# Patient Record
Sex: FEMALE | Race: WHITE | URBAN - METROPOLITAN AREA
[De-identification: names, ages, dates, MRNs, and addresses within clinical notes are randomized per-mention and may not be internally consistent; named-entity substitution may affect disease eponyms.]

---

## 2017-12-26 ENCOUNTER — HOSPITAL ENCOUNTER (OUTPATIENT)
Dept: MEDSURG UNIT | Facility: HOSPITAL | Age: 77
End: 2017-12-27
Attending: HOSPITALIST | Admitting: HOSPITALIST

## 2017-12-26 LAB
ABS NEUT (OLG): 27.84 X10(3)/MCL
ALBUMIN SERPL-MCNC: 2.3 GM/DL (ref 3.4–5)
ALBUMIN/GLOB SERPL: 1 RATIO (ref 1–2)
ALP SERPL-CCNC: 291 UNIT/L (ref 45–117)
ALT SERPL-CCNC: 13 UNIT/L (ref 12–78)
ANISOCYTOSIS BLD QL SMEAR: ABNORMAL
APPEARANCE, UA: ABNORMAL
APTT PPP: 36.2 SECOND(S) (ref 23.3–37)
AST SERPL-CCNC: 55 UNIT/L (ref 15–37)
BACTERIA #/AREA URNS AUTO: ABNORMAL /[HPF]
BASOPHILS NFR BLD MANUAL: 0 %
BILIRUB SERPL-MCNC: 0.4 MG/DL (ref 0.2–1)
BILIRUB UR QL STRIP: NEGATIVE
BILIRUBIN DIRECT+TOT PNL SERPL-MCNC: 0.2 MG/DL
BILIRUBIN DIRECT+TOT PNL SERPL-MCNC: 0.2 MG/DL
BUN SERPL-MCNC: 26 MG/DL (ref 7–18)
CALCIUM SERPL-MCNC: 8.6 MG/DL (ref 8.5–10.1)
CHLORIDE SERPL-SCNC: 99 MMOL/L (ref 98–107)
CK MB SERPL-MCNC: <1 NG/ML (ref 1–3.6)
CK SERPL-CCNC: 25 UNIT/L (ref 26–192)
CO2 SERPL-SCNC: 28 MMOL/L (ref 21–32)
COLOR UR: ABNORMAL
CREAT SERPL-MCNC: 1 MG/DL (ref 0.6–1.3)
CROSSMATCH INTERPRETATION: NORMAL
EOSINOPHIL NFR BLD MANUAL: 4 %
ERYTHROCYTE [DISTWIDTH] IN BLOOD BY AUTOMATED COUNT: 19.2 % (ref 11.5–14.5)
FERRITIN SERPL-MCNC: 67.3 NG/ML (ref 8–388)
GLOBULIN SER-MCNC: 3.6 GM/ML (ref 2.3–3.5)
GLUCOSE (UA): NORMAL
GLUCOSE SERPL-MCNC: 150 MG/DL (ref 74–106)
GRANULOCYTES NFR BLD MANUAL: 85 % (ref 43–75)
HCT VFR BLD AUTO: 16.4 % (ref 35–46)
HGB BLD-MCNC: 5.1 GM/DL (ref 12–16)
HGB UR QL STRIP: >1 MG/DL
HYALINE CASTS #/AREA URNS LPF: 0 /[LPF]
HYPOCHROMIA BLD QL SMEAR: ABNORMAL
INR PPP: 1.12 (ref 0.9–1.2)
IRON SATN MFR SERPL: 10 % (ref 15–50)
IRON SERPL-MCNC: 25 MCG/DL (ref 50–170)
KETONES UR QL STRIP: NEGATIVE
LACTATE SERPL-SCNC: 1.6 MMOL/L (ref 0.4–2)
LEUKOCYTE ESTERASE UR QL STRIP: 500 LEU/UL
LYMPHOCYTES NFR BLD MANUAL: 7 % (ref 20.5–51.1)
MACROCYTES BLD QL SMEAR: ABNORMAL
MCH RBC QN AUTO: 31.5 PG (ref 26–34)
MCHC RBC AUTO-ENTMCNC: 31.1 GM/DL (ref 31–37)
MCV RBC AUTO: 101.2 FL (ref 80–100)
MICROCYTES BLD QL SMEAR: ABNORMAL
MONOCYTES NFR BLD MANUAL: 2 % (ref 2–9)
MUCOUS THREADS URNS QL MICRO: ABNORMAL
NEUTS BAND NFR BLD MANUAL: 2 % (ref 0–10)
NITRITE UR QL STRIP: ABNORMAL
NRBC BLD MANUAL-RTO: 1 %
PH UR STRIP: 8 [PH] (ref 4.5–8)
PLATELET # BLD AUTO: 341 X10(3)/MCL (ref 130–400)
PLATELET # BLD EST: ABNORMAL 10*3/UL
PMV BLD AUTO: 10.5 FL (ref 7.4–10.4)
POC TROPONIN: 0 NG/ML (ref 0–0.08)
POIKILOCYTOSIS BLD QL SMEAR: ABNORMAL
POLYCHROMASIA BLD QL SMEAR: ABNORMAL
POTASSIUM SERPL-SCNC: 4.9 MMOL/L (ref 3.5–5.1)
PRODUCT READY: NORMAL
PROT SERPL-MCNC: 5.9 GM/DL (ref 6.4–8.2)
PROT UR QL STRIP: 100 MG/DL
PROTHROMBIN TIME: 14.2 SECOND(S) (ref 11.9–14.4)
RBC # BLD AUTO: 1.62 X10(6)/MCL (ref 4–5.2)
RBC #/AREA URNS AUTO: >=100 /[HPF]
RBC MORPH BLD: ABNORMAL
SCHISTOCYTES BLD QL AUTO: ABNORMAL
SODIUM SERPL-SCNC: 135 MMOL/L (ref 136–145)
SP GR UR STRIP: 1 (ref 1–1.03)
SPHEROCYTES BLD QL SMEAR: ABNORMAL
SQUAMOUS #/AREA URNS LPF: ABNORMAL /[LPF]
TIBC SERPL-MCNC: 249 MCG/DL (ref 250–450)
TRANSFERRIN SERPL-MCNC: 195 MG/DL (ref 200–360)
TRANSFUSION ORDER: NORMAL
UROBILINOGEN UR STRIP-ACNC: NORMAL
WBC # SPEC AUTO: 35.1 X10(3)/MCL (ref 4.5–11)
WBC #/AREA URNS AUTO: >=100 /HPF

## 2017-12-27 LAB
ABS NEUT (OLG): 20.64 X10(3)/MCL (ref 2.1–9.2)
ALBUMIN SERPL-MCNC: 2 GM/DL (ref 3.4–5)
ALBUMIN/GLOB SERPL: 1 RATIO (ref 1–2)
ALP SERPL-CCNC: 248 UNIT/L (ref 45–117)
ALT SERPL-CCNC: 11 UNIT/L (ref 12–78)
APTT PPP: 32.5 SECOND(S) (ref 23.3–37)
AST SERPL-CCNC: 43 UNIT/L (ref 15–37)
BASOPHILS # BLD AUTO: 0.1 X10(3)/MCL
BASOPHILS NFR BLD AUTO: 0 % (ref 0–1)
BILIRUB SERPL-MCNC: 1.1 MG/DL (ref 0.2–1)
BILIRUBIN DIRECT+TOT PNL SERPL-MCNC: 0.4 MG/DL
BILIRUBIN DIRECT+TOT PNL SERPL-MCNC: 0.7 MG/DL
BUN SERPL-MCNC: 21 MG/DL (ref 7–18)
CALCIUM SERPL-MCNC: 7.7 MG/DL (ref 8.5–10.1)
CHLORIDE SERPL-SCNC: 100 MMOL/L (ref 98–107)
CO2 SERPL-SCNC: 27 MMOL/L (ref 21–32)
COLOR STL: NORMAL
CONSISTENCY STL: NORMAL
CREAT SERPL-MCNC: 0.8 MG/DL (ref 0.6–1.3)
EOSINOPHIL # BLD AUTO: 1.32 10*3/UL
EOSINOPHIL NFR BLD AUTO: 5 % (ref 0–5)
ERYTHROCYTE [DISTWIDTH] IN BLOOD BY AUTOMATED COUNT: 17.8 % (ref 11.5–14.5)
GLOBULIN SER-MCNC: 3.4 GM/ML (ref 2.3–3.5)
GLUCOSE SERPL-MCNC: 116 MG/DL (ref 74–106)
HCT VFR BLD AUTO: 24.9 % (ref 35–46)
HEMOCCULT SP1 STL QL: NEGATIVE
HGB BLD-MCNC: 8.4 GM/DL (ref 12–16)
IMM GRANULOCYTES # BLD AUTO: 0.37 10*3/UL
IMM GRANULOCYTES NFR BLD AUTO: 1 %
INR PPP: 1.21 (ref 0.9–1.2)
LYMPHOCYTES # BLD AUTO: 1.84 X10(3)/MCL
LYMPHOCYTES NFR BLD AUTO: 7 % (ref 15–40)
MCH RBC QN AUTO: 31.3 PG (ref 26–34)
MCHC RBC AUTO-ENTMCNC: 33.7 GM/DL (ref 31–37)
MCV RBC AUTO: 92.9 FL (ref 80–100)
MONOCYTES # BLD AUTO: 1.7 X10(3)/MCL
MONOCYTES NFR BLD AUTO: 6 % (ref 4–12)
NEUTROPHILS # BLD AUTO: 20.64 X10(3)/MCL
NEUTROPHILS NFR BLD AUTO: 80 X10(3)/MCL
PLATELET # BLD AUTO: 260 X10(3)/MCL (ref 130–400)
PMV BLD AUTO: 10.6 FL (ref 7.4–10.4)
POTASSIUM SERPL-SCNC: 4.5 MMOL/L (ref 3.5–5.1)
PROT SERPL-MCNC: 5.4 GM/DL (ref 6.4–8.2)
PROTHROMBIN TIME: 15.1 SECOND(S) (ref 11.9–14.4)
RBC # BLD AUTO: 2.68 X10(6)/MCL (ref 4–5.2)
SODIUM SERPL-SCNC: 137 MMOL/L (ref 136–145)
WBC # SPEC AUTO: 26 X10(3)/MCL (ref 4.5–11)

## 2017-12-31 LAB
FINAL CULTURE: NORMAL
FINAL CULTURE: NORMAL

## 2021-06-17 ENCOUNTER — HOSPITAL ENCOUNTER (OUTPATIENT)
Dept: MEDSURG UNIT | Facility: HOSPITAL | Age: 81
End: 2021-06-18
Attending: INTERNAL MEDICINE | Admitting: INTERNAL MEDICINE

## 2021-06-17 LAB
ABS NEUT (OLG): 6.83 X10(3)/MCL (ref 2.1–9.2)
ALBUMIN SERPL-MCNC: 3 GM/DL (ref 3.4–4.8)
ALBUMIN/GLOB SERPL: 0.9 RATIO (ref 1.1–2)
ALP SERPL-CCNC: 302 UNIT/L (ref 40–150)
ALT SERPL-CCNC: 20 UNIT/L (ref 0–55)
ANISOCYTOSIS BLD QL SMEAR: NORMAL
APPEARANCE, UA: ABNORMAL
APTT PPP: 30.8 SECOND(S) (ref 23.3–37)
APTT PPP: 34.9 SECOND(S) (ref 23.3–37)
AST SERPL-CCNC: 43 UNIT/L (ref 5–34)
BACTERIA #/AREA URNS AUTO: ABNORMAL /HPF
BASOPHILS # BLD AUTO: 0.1 X10(3)/MCL (ref 0–0.2)
BASOPHILS NFR BLD AUTO: 1 %
BILIRUB SERPL-MCNC: 0.8 MG/DL
BILIRUB UR QL STRIP: NEGATIVE
BILIRUBIN DIRECT+TOT PNL SERPL-MCNC: 0.3 MG/DL (ref 0–0.8)
BILIRUBIN DIRECT+TOT PNL SERPL-MCNC: 0.5 MG/DL (ref 0–0.5)
BNP BLD-MCNC: 174.7 PG/ML
BUN SERPL-MCNC: 14.3 MG/DL (ref 9.8–20.1)
CALCIUM SERPL-MCNC: 9.1 MG/DL (ref 8.4–10.2)
CHLORIDE SERPL-SCNC: 90 MMOL/L (ref 98–107)
CHLORIDE UR-SCNC: 43 MMOL/L
CO2 SERPL-SCNC: 29 MMOL/L (ref 23–31)
COLOR UR: ABNORMAL
CREAT SERPL-MCNC: 0.82 MG/DL (ref 0.55–1.02)
CROSSMATCH INTERPRETATION: NORMAL
EOSINOPHIL # BLD AUTO: 0.8 X10(3)/MCL (ref 0–0.9)
EOSINOPHIL NFR BLD AUTO: 8 %
ERYTHROCYTE [DISTWIDTH] IN BLOOD BY AUTOMATED COUNT: 19.2 % (ref 11.5–14.5)
EST. AVERAGE GLUCOSE BLD GHB EST-MCNC: 99.7 MG/DL
GLOBULIN SER-MCNC: 3.2 GM/DL (ref 2.4–3.5)
GLUCOSE (UA): NEGATIVE
GLUCOSE SERPL-MCNC: 121 MG/DL (ref 82–115)
HBA1C MFR BLD: 5.1 %
HCT VFR BLD AUTO: 23.7 % (ref 35–46)
HCT VFR BLD AUTO: 29.1 % (ref 35–46)
HGB BLD-MCNC: 6.3 GM/DL (ref 12–16)
HGB BLD-MCNC: 8.3 GM/DL (ref 12–16)
HGB UR QL STRIP: >1 MG/DL
HYALINE CASTS #/AREA URNS LPF: ABNORMAL /LPF
HYPOCHROMIA BLD QL SMEAR: NORMAL
IMM GRANULOCYTES # BLD AUTO: 0.05 10*3/UL
IMM GRANULOCYTES NFR BLD AUTO: 0 %
INR PPP: 1.18 (ref 0.9–1.2)
INR PPP: 1.2 (ref 0.9–1.2)
KETONES UR QL STRIP: ABNORMAL
LEUKOCYTE ESTERASE UR QL STRIP: 500 LEU/UL
LYMPHOCYTES # BLD AUTO: 1.1 X10(3)/MCL (ref 0.6–4.6)
LYMPHOCYTES NFR BLD AUTO: 12 %
MAGNESIUM SERPL-MCNC: 1.6 MG/DL (ref 1.6–2.6)
MCH RBC QN AUTO: 17.5 PG (ref 26–34)
MCHC RBC AUTO-ENTMCNC: 26.6 GM/DL (ref 31–37)
MCV RBC AUTO: 66 FL (ref 80–100)
MICROCYTES BLD QL SMEAR: NORMAL
MONOCYTES # BLD AUTO: 0.7 X10(3)/MCL (ref 0.1–1.3)
MONOCYTES NFR BLD AUTO: 7 %
NEUTROPHILS # BLD AUTO: 6.83 X10(3)/MCL (ref 2.1–9.2)
NEUTROPHILS NFR BLD AUTO: 72 %
NITRITE UR QL STRIP: NEGATIVE
NRBC BLD AUTO-RTO: 0.2 % (ref 0–0.2)
OSMOLALITY SERPL: 265 MOSM/KG (ref 280–300)
OSMOLALITY UR: 639 MOSM/KG (ref 300–1300)
OVALOCYTES BLD QL SMEAR: NORMAL
PH UR STRIP: 6 [PH] (ref 4.5–8)
PHOSPHATE SERPL-MCNC: 3.3 MG/DL (ref 2.3–4.7)
PLATELET # BLD AUTO: 252 X10(3)/MCL (ref 130–400)
PLATELET # BLD EST: ADEQUATE 10*3/UL
PMV BLD AUTO: 8.9 FL (ref 7.4–10.4)
POIKILOCYTOSIS BLD QL SMEAR: NORMAL
POLYCHROMASIA BLD QL SMEAR: NORMAL
POTASSIUM SERPL-SCNC: 5.6 MMOL/L (ref 3.5–5.1)
POTASSIUM UR-SCNC: 53 MMOL/L
PRODUCT READY: NORMAL
PROT SERPL-MCNC: 6.2 GM/DL (ref 5.8–7.6)
PROT UR QL STRIP: 70 MG/DL
PROTHROMBIN TIME: 14.9 SECOND(S) (ref 11.9–14.4)
PROTHROMBIN TIME: 15.1 SECOND(S) (ref 11.9–14.4)
RBC # BLD AUTO: 3.59 X10(6)/MCL (ref 4–5.2)
RBC #/AREA URNS AUTO: >=100 /HPF
RBC MORPH BLD: NORMAL
SARS-COV-2 AG RESP QL IA.RAPID: NEGATIVE
SCHISTOCYTES BLD QL AUTO: NORMAL
SODIUM SERPL-SCNC: 127 MMOL/L (ref 136–145)
SODIUM UR-SCNC: 20 MMOL/L
SP GR UR STRIP: >=1.05 (ref 1–1.03)
SQUAMOUS #/AREA URNS LPF: ABNORMAL /LPF
T4 FREE SERPL-MCNC: 1.34 NG/DL (ref 0.7–1.48)
TARGETS BLD QL SMEAR: NORMAL
TROPONIN I SERPL-MCNC: <0.01 NG/ML (ref 0–0.04)
TSH SERPL-ACNC: 1.11 UIU/ML (ref 0.35–4.94)
UROBILINOGEN UR STRIP-ACNC: NORMAL
WBC # SPEC AUTO: 9.6 X10(3)/MCL (ref 4.5–11)
WBC #/AREA URNS AUTO: ABNORMAL /HPF

## 2021-06-18 LAB
ABS NEUT (OLG): 8.77 X10(3)/MCL (ref 2.1–9.2)
ALBUMIN SERPL-MCNC: 2.9 GM/DL (ref 3.4–4.8)
ALBUMIN/GLOB SERPL: 1 RATIO (ref 1.1–2)
ALP SERPL-CCNC: 281 UNIT/L (ref 40–150)
ALT SERPL-CCNC: 20 UNIT/L (ref 0–55)
AST SERPL-CCNC: 41 UNIT/L (ref 5–34)
BASOPHILS # BLD AUTO: 0.1 X10(3)/MCL (ref 0–0.2)
BASOPHILS NFR BLD AUTO: 1 %
BILIRUB SERPL-MCNC: 1 MG/DL
BILIRUBIN DIRECT+TOT PNL SERPL-MCNC: 0.5 MG/DL (ref 0–0.5)
BILIRUBIN DIRECT+TOT PNL SERPL-MCNC: 0.5 MG/DL (ref 0–0.8)
BUN SERPL-MCNC: 10.2 MG/DL (ref 9.8–20.1)
BUN SERPL-MCNC: 8.2 MG/DL (ref 9.8–20.1)
CALCIUM SERPL-MCNC: 8 MG/DL (ref 8.4–10.2)
CALCIUM SERPL-MCNC: 8.5 MG/DL (ref 8.4–10.2)
CHLORIDE SERPL-SCNC: 90 MMOL/L (ref 98–107)
CHLORIDE SERPL-SCNC: 91 MMOL/L (ref 98–107)
CO2 SERPL-SCNC: 26 MMOL/L (ref 23–31)
CO2 SERPL-SCNC: 28 MMOL/L (ref 23–31)
CREAT SERPL-MCNC: 0.7 MG/DL (ref 0.55–1.02)
CREAT SERPL-MCNC: 0.74 MG/DL (ref 0.55–1.02)
CREAT SERPL-MCNC: 0.77 MG/DL (ref 0.55–1.02)
CREAT UR-MCNC: 16.4 MG/DL (ref 45–106)
CREAT/UREA NIT SERPL: 11
EOSINOPHIL # BLD AUTO: 1 X10(3)/MCL (ref 0–0.9)
EOSINOPHIL NFR BLD AUTO: 8 %
ERYTHROCYTE [DISTWIDTH] IN BLOOD BY AUTOMATED COUNT: 22.4 % (ref 11.5–14.5)
FERRITIN SERPL-MCNC: 8.24 NG/ML (ref 4.63–204)
FESODIUM % (OHS): 3 %
GLOBULIN SER-MCNC: 2.9 GM/DL (ref 2.4–3.5)
GLUCOSE SERPL-MCNC: 122 MG/DL (ref 82–115)
GLUCOSE SERPL-MCNC: 141 MG/DL (ref 82–115)
HCT VFR BLD AUTO: 29.4 % (ref 35–46)
HGB BLD-MCNC: 8.5 GM/DL (ref 12–16)
IMM GRANULOCYTES # BLD AUTO: 0.04 10*3/UL
IMM GRANULOCYTES NFR BLD AUTO: 0 %
IRON SATN MFR SERPL: 5 % (ref 20–50)
IRON SERPL-MCNC: 13 UG/DL (ref 50–170)
LYMPHOCYTES # BLD AUTO: 1.1 X10(3)/MCL (ref 0.6–4.6)
LYMPHOCYTES NFR BLD AUTO: 10 %
MAGNESIUM SERPL-MCNC: 1.8 MG/DL (ref 1.6–2.6)
MCH RBC QN AUTO: 20.7 PG (ref 26–34)
MCHC RBC AUTO-ENTMCNC: 28.9 GM/DL (ref 31–37)
MCV RBC AUTO: 71.5 FL (ref 80–100)
MONOCYTES # BLD AUTO: 0.7 X10(3)/MCL (ref 0.1–1.3)
MONOCYTES NFR BLD AUTO: 6 %
NEUTROPHILS # BLD AUTO: 8.77 X10(3)/MCL (ref 2.1–9.2)
NEUTROPHILS NFR BLD AUTO: 75 %
NRBC BLD AUTO-RTO: 0.2 % (ref 0–0.2)
PLATELET # BLD AUTO: 255 X10(3)/MCL (ref 130–400)
PMV BLD AUTO: 9.3 FL (ref 7.4–10.4)
POTASSIUM SERPL-SCNC: 3.3 MMOL/L (ref 3.5–5.1)
POTASSIUM SERPL-SCNC: 3.8 MMOL/L (ref 3.5–5.1)
PROT SERPL-MCNC: 5.8 GM/DL (ref 5.8–7.6)
RBC # BLD AUTO: 4.11 X10(6)/MCL (ref 4–5.2)
SODIUM SERPL-SCNC: 126 MMOL/L (ref 136–145)
SODIUM SERPL-SCNC: 129 MMOL/L (ref 136–145)
SODIUM UR-SCNC: 84 MMOL/L
TIBC SERPL-MCNC: 261 UG/DL (ref 70–310)
TIBC SERPL-MCNC: 274 UG/DL (ref 250–450)
TRANSFERRIN SERPL-MCNC: 250 MG/DL
WBC # SPEC AUTO: 11.7 X10(3)/MCL (ref 4.5–11)

## 2021-06-20 LAB — FINAL CULTURE: NORMAL

## 2021-07-29 ENCOUNTER — HISTORICAL (OUTPATIENT)
Dept: ADMINISTRATIVE | Facility: HOSPITAL | Age: 81
End: 2021-07-29

## 2021-07-29 LAB
ABS NEUT (OLG): 4.86 X10(3)/MCL (ref 2.1–9.2)
ALBUMIN SERPL-MCNC: 2.4 GM/DL (ref 3.4–4.8)
ALBUMIN/GLOB SERPL: 0.8 RATIO (ref 1.1–2)
ALP SERPL-CCNC: 135 UNIT/L (ref 40–150)
ALT SERPL-CCNC: 8 UNIT/L (ref 0–55)
ANISOCYTOSIS BLD QL SMEAR: ABNORMAL
AST SERPL-CCNC: 19 UNIT/L (ref 5–34)
BASOPHILS # BLD AUTO: 0.03 X10(3)/MCL (ref 0–0.2)
BASOPHILS NFR BLD AUTO: 0.5 % (ref 0–1)
BILIRUB SERPL-MCNC: 0.5 MG/DL (ref 0.2–1.2)
BILIRUBIN DIRECT+TOT PNL SERPL-MCNC: 0.2 MG/DL (ref 0–0.5)
BILIRUBIN DIRECT+TOT PNL SERPL-MCNC: 0.3 MG/DL (ref 0–0.8)
BUN SERPL-MCNC: 9.3 MG/DL (ref 9.8–20.1)
CALCIUM SERPL-MCNC: 8.4 MG/DL (ref 8.4–10.2)
CHLORIDE SERPL-SCNC: 97 MMOL/L (ref 98–107)
CHOLEST SERPL-MCNC: 134 MG/DL
CHOLEST/HDLC SERPL: 4 {RATIO} (ref 0–5)
CO2 SERPL-SCNC: 30 MMOL/L (ref 23–31)
CREAT SERPL-MCNC: 0.5 MG/DL (ref 0.57–1.11)
EOSINOPHIL # BLD AUTO: 0.52 X10(3)/MCL (ref 0–0.9)
EOSINOPHIL NFR BLD AUTO: 8 % (ref 0–6.4)
ERYTHROCYTE [DISTWIDTH] IN BLOOD BY AUTOMATED COUNT: 27.2 % (ref 11.5–17)
GLOBULIN SER-MCNC: 3 GM/DL (ref 2.4–3.5)
GLUCOSE SERPL-MCNC: 137 MG/DL (ref 82–115)
HCT VFR BLD AUTO: 28.4 % (ref 37–47)
HDLC SERPL-MCNC: 34 MG/DL (ref 40–60)
HGB BLD-MCNC: 8.7 GM/DL (ref 12–16)
HYPOCHROMIA BLD QL SMEAR: ABNORMAL
IMM GRANULOCYTES # BLD AUTO: 0.04 10*3/UL (ref 0–0.02)
IMM GRANULOCYTES NFR BLD AUTO: 0.6 % (ref 0–0.43)
LDLC SERPL CALC-MCNC: 86 MG/DL (ref 50–140)
LYMPHOCYTES # BLD AUTO: 0.63 X10(3)/MCL (ref 0.6–4.6)
LYMPHOCYTES NFR BLD AUTO: 9.7 % (ref 16–44)
MACROCYTES BLD QL SMEAR: ABNORMAL
MCH RBC QN AUTO: 27.5 PG (ref 27–31)
MCHC RBC AUTO-ENTMCNC: 30.6 GM/DL (ref 33–36)
MCV RBC AUTO: 89.9 FL (ref 80–94)
MICROCYTES BLD QL SMEAR: ABNORMAL
MONOCYTES # BLD AUTO: 0.39 X10(3)/MCL (ref 0.1–1.3)
MONOCYTES NFR BLD AUTO: 6 % (ref 4–12.1)
NEUTROPHILS # BLD AUTO: 4.86 X10(3)/MCL (ref 2.1–9.2)
NEUTROPHILS NFR BLD AUTO: 75.2 % (ref 43–73)
NRBC BLD AUTO-RTO: 0 % (ref 0–0.2)
PLATELET # BLD AUTO: 228 X10(3)/MCL (ref 130–400)
PLATELET # BLD EST: ADEQUATE 10*3/UL
PMV BLD AUTO: 9.9 FL (ref 7.4–10.4)
POTASSIUM SERPL-SCNC: 4.1 MMOL/L (ref 3.5–5.1)
PREALB SERPL-MCNC: 13 MG/DL (ref 14–37)
PROT SERPL-MCNC: 5.4 GM/DL (ref 5.8–7.6)
RBC # BLD AUTO: 3.16 X10(6)/MCL (ref 4.2–5.4)
RBC MORPH BLD: ABNORMAL
SODIUM SERPL-SCNC: 136 MMOL/L (ref 136–145)
TRIGL SERPL-MCNC: 72 MG/DL (ref 0–150)
TSH SERPL-ACNC: 2.98 UIU/ML (ref 0.35–4.94)
VLDLC SERPL CALC-MCNC: 14 MG/DL
WBC # SPEC AUTO: 6.5 X10(3)/MCL (ref 4.5–11.5)

## 2021-08-17 ENCOUNTER — HISTORICAL (OUTPATIENT)
Dept: RADIATION THERAPY | Facility: HOSPITAL | Age: 81
End: 2021-08-17

## 2021-10-06 ENCOUNTER — HISTORICAL (OUTPATIENT)
Dept: ADMINISTRATIVE | Facility: HOSPITAL | Age: 81
End: 2021-10-06

## 2021-10-06 LAB
ABS NEUT (OLG): 7.36 X10(3)/MCL (ref 2.1–9.2)
ALBUMIN SERPL-MCNC: 1.8 GM/DL (ref 3.4–4.8)
ALBUMIN/GLOB SERPL: 0.6 RATIO (ref 1.1–2)
ALP SERPL-CCNC: 125 UNIT/L (ref 40–150)
ALT SERPL-CCNC: 8 UNIT/L (ref 0–55)
AST SERPL-CCNC: 23 UNIT/L (ref 5–34)
BASOPHILS # BLD AUTO: 0.1 X10(3)/MCL (ref 0–0.2)
BASOPHILS NFR BLD AUTO: 1 %
BILIRUB SERPL-MCNC: 0.8 MG/DL
BILIRUBIN DIRECT+TOT PNL SERPL-MCNC: 0.3 MG/DL (ref 0–0.5)
BILIRUBIN DIRECT+TOT PNL SERPL-MCNC: 0.5 MG/DL (ref 0–0.8)
BUN SERPL-MCNC: 6.6 MG/DL (ref 9.8–20.1)
CALCIUM SERPL-MCNC: 7 MG/DL (ref 8.4–10.2)
CHLORIDE SERPL-SCNC: 89 MMOL/L (ref 98–107)
CO2 SERPL-SCNC: 34 MMOL/L (ref 23–31)
CREAT SERPL-MCNC: 0.7 MG/DL (ref 0.55–1.02)
EOSINOPHIL # BLD AUTO: 0.4 X10(3)/MCL (ref 0–0.9)
EOSINOPHIL NFR BLD AUTO: 4 %
ERYTHROCYTE [DISTWIDTH] IN BLOOD BY AUTOMATED COUNT: 15 % (ref 11.5–17)
GLOBULIN SER-MCNC: 3.2 GM/DL (ref 2.4–3.5)
GLUCOSE SERPL-MCNC: 79 MG/DL (ref 82–115)
HCT VFR BLD AUTO: 37.9 % (ref 37–47)
HGB BLD-MCNC: 12.2 GM/DL (ref 12–16)
LYMPHOCYTES # BLD AUTO: 1.9 X10(3)/MCL (ref 0.6–4.6)
LYMPHOCYTES NFR BLD AUTO: 18 %
MCH RBC QN AUTO: 28.9 PG (ref 27–31)
MCHC RBC AUTO-ENTMCNC: 32.2 GM/DL (ref 33–36)
MCV RBC AUTO: 89.8 FL (ref 80–94)
MONOCYTES # BLD AUTO: 0.7 X10(3)/MCL (ref 0.1–1.3)
MONOCYTES NFR BLD AUTO: 6 %
NEUTROPHILS # BLD AUTO: 7.36 X10(3)/MCL (ref 2.1–9.2)
NEUTROPHILS NFR BLD AUTO: 70 %
PLATELET # BLD AUTO: 256 X10(3)/MCL (ref 130–400)
PMV BLD AUTO: 10.2 FL (ref 9.4–12.4)
POTASSIUM SERPL-SCNC: 2.6 MMOL/L (ref 3.5–5.1)
PROT SERPL-MCNC: 5 GM/DL (ref 5.8–7.6)
RBC # BLD AUTO: 4.22 X10(6)/MCL (ref 4.2–5.4)
SODIUM SERPL-SCNC: 140 MMOL/L (ref 136–145)
WBC # SPEC AUTO: 10.5 X10(3)/MCL (ref 4.5–11.5)

## 2021-10-07 ENCOUNTER — HISTORICAL (OUTPATIENT)
Dept: ADMINISTRATIVE | Facility: HOSPITAL | Age: 81
End: 2021-10-07

## 2021-10-07 LAB
ABS NEUT (OLG): 9.26 X10(3)/MCL (ref 2.1–9.2)
ALBUMIN SERPL-MCNC: 1.9 GM/DL (ref 3.4–4.8)
ALBUMIN/GLOB SERPL: 0.6 RATIO (ref 1.1–2)
ALP SERPL-CCNC: 119 UNIT/L (ref 40–150)
ALT SERPL-CCNC: 8 UNIT/L (ref 0–55)
AST SERPL-CCNC: 36 UNIT/L (ref 5–34)
BASOPHILS # BLD AUTO: 0.08 X10(3)/MCL (ref 0–0.2)
BASOPHILS NFR BLD AUTO: 0.6 % (ref 0–1)
BILIRUB SERPL-MCNC: 0.7 MG/DL (ref 0.2–1.2)
BILIRUBIN DIRECT+TOT PNL SERPL-MCNC: 0.3 MG/DL (ref 0–0.5)
BILIRUBIN DIRECT+TOT PNL SERPL-MCNC: 0.4 MG/DL (ref 0–0.8)
BUN SERPL-MCNC: 7.7 MG/DL (ref 9.8–20.1)
CALCIUM SERPL-MCNC: 7.4 MG/DL (ref 8.4–10.2)
CHLORIDE SERPL-SCNC: 90 MMOL/L (ref 98–107)
CO2 SERPL-SCNC: 38 MMOL/L (ref 23–31)
CREAT SERPL-MCNC: 0.72 MG/DL (ref 0.57–1.11)
EOSINOPHIL # BLD AUTO: 0.49 X10(3)/MCL (ref 0–0.9)
EOSINOPHIL NFR BLD AUTO: 3.9 % (ref 0–6.4)
ERYTHROCYTE [DISTWIDTH] IN BLOOD BY AUTOMATED COUNT: 14.8 % (ref 11.5–17)
GLOBULIN SER-MCNC: 3.3 GM/DL (ref 2.4–3.5)
GLUCOSE SERPL-MCNC: 72 MG/DL (ref 82–115)
HCT VFR BLD AUTO: 39.8 % (ref 37–47)
HGB BLD-MCNC: 12.9 GM/DL (ref 12–16)
IMM GRANULOCYTES # BLD AUTO: 0.06 10*3/UL (ref 0–0.02)
IMM GRANULOCYTES NFR BLD AUTO: 0.5 % (ref 0–0.43)
LYMPHOCYTES # BLD AUTO: 1.74 X10(3)/MCL (ref 0.6–4.6)
LYMPHOCYTES NFR BLD AUTO: 13.9 % (ref 16–44)
MCH RBC QN AUTO: 28.5 PG (ref 27–31)
MCHC RBC AUTO-ENTMCNC: 32.4 GM/DL (ref 33–36)
MCV RBC AUTO: 87.9 FL (ref 80–94)
MONOCYTES # BLD AUTO: 0.93 X10(3)/MCL (ref 0.1–1.3)
MONOCYTES NFR BLD AUTO: 7.4 % (ref 4–12.1)
NEUTROPHILS # BLD AUTO: 9.26 X10(3)/MCL (ref 2.1–9.2)
NEUTROPHILS NFR BLD AUTO: 73.7 % (ref 43–73)
NRBC BLD AUTO-RTO: 0 % (ref 0–0.2)
PLATELET # BLD AUTO: 234 X10(3)/MCL (ref 130–400)
PMV BLD AUTO: 10.5 FL (ref 7.4–10.4)
POTASSIUM SERPL-SCNC: 3.7 MMOL/L (ref 3.5–5.1)
PROT SERPL-MCNC: 5.2 GM/DL (ref 5.8–7.6)
RBC # BLD AUTO: 4.53 X10(6)/MCL (ref 4.2–5.4)
SODIUM SERPL-SCNC: 138 MMOL/L (ref 136–145)
WBC # SPEC AUTO: 12.6 X10(3)/MCL (ref 4.5–11.5)

## 2021-10-08 ENCOUNTER — HISTORICAL (OUTPATIENT)
Dept: ADMINISTRATIVE | Facility: HOSPITAL | Age: 81
End: 2021-10-08

## 2021-10-08 LAB
ABS NEUT (OLG): 9.55 X10(3)/MCL (ref 2.1–9.2)
APPEARANCE, UA: ABNORMAL
BACTERIA SPEC CULT: ABNORMAL /HPF
BASOPHILS # BLD AUTO: 0.13 X10(3)/MCL (ref 0–0.2)
BASOPHILS NFR BLD AUTO: 1 % (ref 0–1)
BILIRUB UR QL STRIP: ABNORMAL
COLOR UR: YELLOW
EOSINOPHIL # BLD AUTO: 0.42 X10(3)/MCL (ref 0–0.9)
EOSINOPHIL NFR BLD AUTO: 3.2 % (ref 0–6.4)
ERYTHROCYTE [DISTWIDTH] IN BLOOD BY AUTOMATED COUNT: 15 % (ref 11.5–17)
GLUCOSE (UA): NEGATIVE
HCT VFR BLD AUTO: 42 % (ref 37–47)
HGB BLD-MCNC: 13.4 GM/DL (ref 12–16)
HGB UR QL STRIP: NEGATIVE
IMM GRANULOCYTES # BLD AUTO: 0.05 10*3/UL (ref 0–0.02)
IMM GRANULOCYTES NFR BLD AUTO: 0.4 % (ref 0–0.43)
KETONES UR QL STRIP: NEGATIVE
LEUKOCYTE ESTERASE UR QL STRIP: NEGATIVE
LYMPHOCYTES # BLD AUTO: 2.06 X10(3)/MCL (ref 0.6–4.6)
LYMPHOCYTES NFR BLD AUTO: 15.6 % (ref 16–44)
MCH RBC QN AUTO: 28.6 PG (ref 27–31)
MCHC RBC AUTO-ENTMCNC: 31.9 GM/DL (ref 33–36)
MCV RBC AUTO: 89.7 FL (ref 80–94)
MONOCYTES # BLD AUTO: 1.02 X10(3)/MCL (ref 0.1–1.3)
MONOCYTES NFR BLD AUTO: 7.7 % (ref 4–12.1)
MUCOUS THREADS URNS QL MICRO: ABNORMAL
NEUTROPHILS # BLD AUTO: 9.55 X10(3)/MCL (ref 2.1–9.2)
NEUTROPHILS NFR BLD AUTO: 72.1 % (ref 43–73)
NITRITE UR QL STRIP: POSITIVE
NRBC BLD AUTO-RTO: 0.2 % (ref 0–0.2)
PH UR STRIP: 6 [PH] (ref 5–9)
PLATELET # BLD AUTO: 265 X10(3)/MCL (ref 130–400)
PMV BLD AUTO: 10.2 FL (ref 7.4–10.4)
PROT UR QL STRIP: NEGATIVE
RBC # BLD AUTO: 4.68 X10(6)/MCL (ref 4.2–5.4)
RBC #/AREA URNS HPF: ABNORMAL /[HPF]
SP GR UR STRIP: 1.03 (ref 1–1.03)
SQUAMOUS EPITHELIAL, UA: ABNORMAL /HPF
UROBILINOGEN UR STRIP-ACNC: 1
WBC # SPEC AUTO: 13.2 X10(3)/MCL (ref 4.5–11.5)
WBC #/AREA URNS HPF: ABNORMAL /HPF

## 2021-10-11 LAB — FINAL CULTURE: NO GROWTH

## 2021-10-13 ENCOUNTER — HISTORICAL (OUTPATIENT)
Dept: ADMINISTRATIVE | Facility: HOSPITAL | Age: 81
End: 2021-10-13

## 2021-10-13 LAB
ABS NEUT (OLG): 8.09 X10(3)/MCL (ref 2.1–9.2)
BASOPHILS # BLD AUTO: 0.09 X10(3)/MCL (ref 0–0.2)
BASOPHILS NFR BLD AUTO: 0.8 % (ref 0–1)
EOSINOPHIL # BLD AUTO: 0.6 X10(3)/MCL (ref 0–0.9)
EOSINOPHIL NFR BLD AUTO: 5.1 % (ref 0–6.4)
ERYTHROCYTE [DISTWIDTH] IN BLOOD BY AUTOMATED COUNT: 15 % (ref 11.5–17)
HCT VFR BLD AUTO: 38.6 % (ref 37–47)
HGB BLD-MCNC: 12.4 GM/DL (ref 12–16)
IMM GRANULOCYTES # BLD AUTO: 0.05 10*3/UL (ref 0–0.02)
IMM GRANULOCYTES NFR BLD AUTO: 0.4 % (ref 0–0.43)
LYMPHOCYTES # BLD AUTO: 2.05 X10(3)/MCL (ref 0.6–4.6)
LYMPHOCYTES NFR BLD AUTO: 17.5 % (ref 16–44)
MCH RBC QN AUTO: 28.6 PG (ref 27–31)
MCHC RBC AUTO-ENTMCNC: 32.1 GM/DL (ref 33–36)
MCV RBC AUTO: 89.1 FL (ref 80–94)
MONOCYTES # BLD AUTO: 0.82 X10(3)/MCL (ref 0.1–1.3)
MONOCYTES NFR BLD AUTO: 7 % (ref 4–12.1)
NEUTROPHILS # BLD AUTO: 8.09 X10(3)/MCL (ref 2.1–9.2)
NEUTROPHILS NFR BLD AUTO: 69.2 % (ref 43–73)
NRBC BLD AUTO-RTO: 0 % (ref 0–0.2)
PLATELET # BLD AUTO: 230 X10(3)/MCL (ref 130–400)
PMV BLD AUTO: 10.4 FL (ref 7.4–10.4)
RBC # BLD AUTO: 4.33 X10(6)/MCL (ref 4.2–5.4)
WBC # SPEC AUTO: 11.7 X10(3)/MCL (ref 4.5–11.5)

## 2021-11-12 ENCOUNTER — HISTORICAL (OUTPATIENT)
Dept: ADMINISTRATIVE | Facility: HOSPITAL | Age: 81
End: 2021-11-12

## 2021-11-12 LAB — SARS-COV-2 RNA RESP QL NAA+PROBE: NOT DETECTED

## 2021-12-13 ENCOUNTER — HISTORICAL (OUTPATIENT)
Dept: ADMINISTRATIVE | Facility: HOSPITAL | Age: 81
End: 2021-12-13

## 2021-12-13 LAB
ABS NEUT (OLG): 3.82 X10(3)/MCL (ref 2.1–9.2)
ALBUMIN SERPL-MCNC: 2.3 GM/DL (ref 3.4–4.8)
ALBUMIN/GLOB SERPL: 0.8 RATIO (ref 1.1–2)
ALP SERPL-CCNC: 131 UNIT/L (ref 40–150)
ALT SERPL-CCNC: 10 UNIT/L (ref 0–55)
AST SERPL-CCNC: 23 UNIT/L (ref 5–34)
BASOPHILS # BLD AUTO: 0 X10(3)/MCL (ref 0–0.2)
BASOPHILS NFR BLD AUTO: 1 %
BILIRUB SERPL-MCNC: 0.3 MG/DL
BILIRUBIN DIRECT+TOT PNL SERPL-MCNC: 0.1 MG/DL (ref 0–0.8)
BILIRUBIN DIRECT+TOT PNL SERPL-MCNC: 0.2 MG/DL (ref 0–0.5)
BUN SERPL-MCNC: 14.9 MG/DL (ref 9.8–20.1)
CALCIUM SERPL-MCNC: 8.2 MG/DL (ref 8.7–10.5)
CHLORIDE SERPL-SCNC: 104 MMOL/L (ref 98–107)
CO2 SERPL-SCNC: 26 MMOL/L (ref 23–31)
CREAT SERPL-MCNC: 0.7 MG/DL (ref 0.55–1.02)
CRP SERPL-MCNC: 0.78 MG/DL
EOSINOPHIL # BLD AUTO: 0.4 X10(3)/MCL (ref 0–0.9)
EOSINOPHIL NFR BLD AUTO: 7 %
ERYTHROCYTE [DISTWIDTH] IN BLOOD BY AUTOMATED COUNT: 15.7 % (ref 11.5–17)
ERYTHROCYTE [SEDIMENTATION RATE] IN BLOOD: 48 MM/HR (ref 0–20)
GLOBULIN SER-MCNC: 2.9 GM/DL (ref 2.4–3.5)
GLUCOSE SERPL-MCNC: 125 MG/DL (ref 82–115)
HCT VFR BLD AUTO: 25.4 % (ref 37–47)
HGB BLD-MCNC: 8.4 GM/DL (ref 12–16)
LYMPHOCYTES # BLD AUTO: 1.1 X10(3)/MCL (ref 0.6–4.6)
LYMPHOCYTES NFR BLD AUTO: 18 %
MCH RBC QN AUTO: 30.1 PG (ref 27–31)
MCHC RBC AUTO-ENTMCNC: 33.1 GM/DL (ref 33–36)
MCV RBC AUTO: 91 FL (ref 80–94)
MONOCYTES # BLD AUTO: 0.6 X10(3)/MCL (ref 0.1–1.3)
MONOCYTES NFR BLD AUTO: 10 %
NEUTROPHILS # BLD AUTO: 3.82 X10(3)/MCL (ref 2.1–9.2)
NEUTROPHILS NFR BLD AUTO: 63 %
PLATELET # BLD AUTO: 257 X10(3)/MCL (ref 130–400)
PMV BLD AUTO: 9.9 FL (ref 9.4–12.4)
POTASSIUM SERPL-SCNC: 3.8 MMOL/L (ref 3.5–5.1)
PROT SERPL-MCNC: 5.2 GM/DL (ref 5.8–7.6)
RBC # BLD AUTO: 2.79 X10(6)/MCL (ref 4.2–5.4)
SODIUM SERPL-SCNC: 138 MMOL/L (ref 136–145)
WBC # SPEC AUTO: 6 X10(3)/MCL (ref 4.5–11.5)

## 2022-01-19 ENCOUNTER — HISTORICAL (OUTPATIENT)
Dept: ADMINISTRATIVE | Facility: HOSPITAL | Age: 82
End: 2022-01-19

## 2022-01-19 LAB
ABS NEUT (OLG): 2.56 X10(3)/MCL (ref 2.1–9.2)
BASOPHILS # BLD AUTO: 0 X10(3)/MCL (ref 0–0.2)
BASOPHILS NFR BLD AUTO: 0 %
EOSINOPHIL # BLD AUTO: 0.1 X10(3)/MCL (ref 0–0.9)
EOSINOPHIL NFR BLD AUTO: 3 %
ERYTHROCYTE [DISTWIDTH] IN BLOOD BY AUTOMATED COUNT: 15 % (ref 11.5–17)
HCT VFR BLD AUTO: 29.7 % (ref 37–47)
HGB BLD-MCNC: 9.1 GM/DL (ref 12–16)
LYMPHOCYTES # BLD AUTO: 0.9 X10(3)/MCL (ref 0.6–4.6)
LYMPHOCYTES NFR BLD AUTO: 22 %
MCH RBC QN AUTO: 28 PG (ref 27–31)
MCHC RBC AUTO-ENTMCNC: 30.6 GM/DL (ref 33–36)
MCV RBC AUTO: 91.4 FL (ref 80–94)
MONOCYTES # BLD AUTO: 0.4 X10(3)/MCL (ref 0.1–1.3)
MONOCYTES NFR BLD AUTO: 9 %
NEUTROPHILS # BLD AUTO: 2.56 X10(3)/MCL (ref 2.1–9.2)
NEUTROPHILS NFR BLD AUTO: 65 %
PLATELET # BLD AUTO: 170 X10(3)/MCL (ref 130–400)
PMV BLD AUTO: 11 FL (ref 9.4–12.4)
RBC # BLD AUTO: 3.25 X10(6)/MCL (ref 4.2–5.4)
TSH SERPL-ACNC: 3.83 UIU/ML (ref 0.35–4.94)
WBC # SPEC AUTO: 4 X10(3)/MCL (ref 4.5–11.5)

## 2022-04-27 ENCOUNTER — HISTORICAL (OUTPATIENT)
Dept: ADMINISTRATIVE | Facility: HOSPITAL | Age: 82
End: 2022-04-27
Payer: MEDICARE

## 2022-04-27 LAB
ABS NEUT (OLG): 5.18 (ref 2.1–9.2)
BASOPHILS # BLD AUTO: 0.05 10*3/UL (ref 0–0.2)
BASOPHILS NFR BLD AUTO: 0.6 % (ref 0–1)
EOSINOPHIL # BLD AUTO: 0.66 10*3/UL (ref 0–0.9)
EOSINOPHIL NFR BLD AUTO: 8.3 % (ref 0–6.4)
ERYTHROCYTE [DISTWIDTH] IN BLOOD BY AUTOMATED COUNT: 15 % (ref 11.5–17)
HCT VFR BLD AUTO: 32.5 % (ref 37–47)
HGB BLD-MCNC: 10 G/DL (ref 12–16)
IMM GRANULOCYTES # BLD AUTO: 0.02 10*3/UL (ref 0–0.02)
IMM GRANULOCYTES NFR BLD AUTO: 0.3 % (ref 0–0.43)
LYMPHOCYTES # BLD AUTO: 1.25 10*3/UL (ref 0.6–4.6)
LYMPHOCYTES NFR BLD AUTO: 15.7 % (ref 16–44)
MANUAL DIFF? (OHS): NO
MCH RBC QN AUTO: 26.7 PG (ref 27–31)
MCHC RBC AUTO-ENTMCNC: 30.8 G/DL (ref 33–36)
MCV RBC AUTO: 86.9 FL (ref 80–94)
MONOCYTES # BLD AUTO: 0.79 10*3/UL (ref 0.1–1.3)
MONOCYTES NFR BLD AUTO: 9.9 % (ref 4–12.1)
NEUTROPHILS # BLD AUTO: 5.18 10*3/UL (ref 2.1–9.2)
NEUTROPHILS NFR BLD AUTO: 65.2 % (ref 43–73)
NRBC BLD AUTO-RTO: 0 % (ref 0–0.2)
PLATELET # BLD AUTO: 286 10*3/UL (ref 130–400)
PMV BLD AUTO: 10 FL (ref 7.4–10.4)
RBC # BLD AUTO: 3.74 10*6/UL (ref 4.2–5.4)
WBC # SPEC AUTO: 8 10*3/UL (ref 4.5–11.5)

## 2022-04-30 NOTE — ED PROVIDER NOTES
Patient:   Stephania Cook             MRN: [id number]            FIN: [id number]               Age:   77 years     Sex:  Female     :  1940   Associated Diagnoses:   Abnormal uterine bleeding (AUB); Symptomatic anemia   Author:   Khris Butler MD      Basic Information   Time seen: Date & time 2017 13:35:00.   History source: Patient.   Arrival mode: Private vehicle.   History limitation: None.   Additional information: Chief Complaint from Nursing Triage Note : Chief Complaint   2017 13:34 CST     Chief Complaint           pt here for bright red vaginal bleeding with clots, today pt began with weakness and near syncope  .      History of Present Illness   The patient presents with vaginal bleeding.  The onset was 5  days ago.  The course/duration of symptoms is constant.  Location: vagina. The degree of symptoms is minimal.  Risk factors consist of history of anemia.  Prior episodes: occasional.  Therapy today: none.  Associated symptoms: weakness, fatigue, 2 syncopal events., denies fever and denies chills.      77-year-old female with a history of hypertension and CHF brought into the emergency room by EMS for medical screening evaluation.  Patient began having heavy vaginal bleeding on 17 which continued through 17 were patient passed much blood including large blood clots.  Patient has progressively become more lightheaded and weak with difficulty standing.  She has been lying in bed for the last few days.  When the daughter was no longer able to take care of her, she can emergency room for evaluation.  Patient had 2 episodes of a syncopal event which lasted less than 2 seconds 1 with going from lying to sitting acutely.    Daughter also reports the patient drinks 3/5 of alcohol weekly..        Review of Systems   Constitutional symptoms:  Negative except as documented in HPI.   Skin symptoms:  Negative except as documented in HPI.   Eye symptoms:  Negative  except as documented in HPI.   ENMT symptoms:  Negative except as documented in HPI.   Respiratory symptoms:  Negative except as documented in HPI.   Cardiovascular symptoms:  Negative except as documented in HPI.   Gastrointestinal symptoms:  Negative except as documented in HPI.   Genitourinary symptoms:  Negative except as documented in HPI.   Musculoskeletal symptoms:  Negative except as documented in HPI.   Neurologic symptoms:  Negative except as documented in HPI.   Psychiatric symptoms:  Negative except as documented in HPI.   Endocrine symptoms:  Negative except as documented in HPI.   Hematologic/Lymphatic symptoms:  Negative except as documented in HPI.   Allergy/immunologic symptoms:  Negative except as documented in HPI.             Additional review of systems information: All other systems reviewed and otherwise negative.      Health Status   Allergies:    Allergic Reactions (Selected)  No Known Allergies,    Allergies (1) Active Reaction  No Known Allergies None Documented  .   Medications:  (Selected)   Documented Medications  Documented  CARVEDILOL 12.5 MG TABLET: 12.5 mg = 1 tab(s), Oral, BID  CLONAZEPAM 0.5 MG TABLET:   FUROSEMIDE 20 MG TABLET: 20 mg = 1 tab(s), Oral, Daily  ISOSORBIDE [place] ER 30 MG TABLET: 30 mg = 1 tab(s), Oral, Daily.      Past Medical/ Family/ Social History   Medical history:    No active or resolved past medical history items have been selected or recorded..   Surgical history:    No active procedure history items have been selected or recorded..   Family history:    No family history items have been selected or recorded..   Problem list:    No qualifying data available  .      Physical Examination               Vital Signs   Vital Signs   12/26/2017 13:34 CST     Temperature Oral          36.5 DegC                             Temperature Oral (calculated)             97.70 DegF                             Peripheral Pulse Rate     93 bpm                              Respiratory Rate          22 br/min                             Oxygen Therapy            Room air                             Systolic Blood Pressure   123 mmHg                             Diastolic Blood Pressure  78 mmHg  .      Vital Signs (last 24 hrs)_____  Last Charted___________  Temp Oral     36.5 DegC  (DEC 26 13:34)  Heart Rate Peripheral   93 bpm  (DEC 26 13:34)  Resp Rate         22 br/min  (DEC 26 13:34)  SBP      123 mmHg  (DEC 26 13:34)  DBP      78 mmHg  (DEC 26 13:34)  .   Measurements   12/26/2017 13:34 CST     Weight Estimated          136.5 kg  .   Basic Oxygen Information   12/26/2017 13:34 CST     Oxygen Therapy            Room air  .   General:  Alert, no acute distress.    Skin:  Intact, moist.    Head:  Normocephalic, atraumatic.    Neck:  Supple, trachea midline, no tenderness.    Eye:  Pupils are equal, round and reactive to light, extraocular movements are intact, Pale conjunctiva.    Ears, nose, mouth and throat:  Oral mucosa moist.   Cardiovascular:  Regular rate and rhythm, No murmur, Normal peripheral perfusion.    Respiratory:  Lungs are clear to auscultation, respirations are non-labored, breath sounds are equal.    Gastrointestinal:  Soft, Nontender, Non distended, Normal bowel sounds.    Genitourinary:  Exam deferred.   Neurological:  Alert and oriented to person, place, time, and situation, No focal neurological deficit observed.    Psychiatric:  Cooperative, appropriate mood & affect.       Medical Decision Making   Documents reviewed:  Emergency department nurses' notes.   Results review:  Lab results : Lab View   12/26/2017 14:50 CST     UA Appear                 Cloudy                             UA Color                  [place]                             UA Spec Grav              1.005                             UA Bili                   Negative                             UA pH                     8.0                             UA Urobilinogen           Normal                              UA Blood                  >1.0 mg/dL                             UA Glucose                Normal                             UA Ketones                Negative                             UA Protein                100 mg/dL                             UA Nitrite                2+                             UA Leuk Est               500 [place]/uL                             UA WBC Interp             >=100 /HPF                             UA RBC Interp             >=100                             UA Mucous                 Small                             UA Bact Interp            Many                             UA Squam Epi Interp       1-2                             UA Hyal Cast Interp       0    12/26/2017 14:15 CST     Iron Lvl                  25 mcg/dL  LOW                             Transferrin               195.0 mg/dL  LOW                             TIBC                      249 mcg/dL  LOW                             Iron Sat                  10.0 %  LOW                             Ferritin Lvl              67.3 ng/mL    12/26/2017 14:00 CST     POC Troponin              0.00 ng/mL    12/26/2017 13:45 CST     Sodium Lvl                135 mmol/L  LOW                             Potassium Lvl             4.9 mmol/L                             Chloride                  99 mmol/L                             CO2                       28 mmol/L                             Calcium Lvl               8.6 mg/dL                             Glucose Lvl               150 mg/dL  HI                             BUN                       26 mg/dL  HI                             Creatinine                1.00 mg/dL                             eGFR-AA                   69 mL/min  LOW                             eGFR-LINDA                  57 mL/min  LOW                             Bili Total                0.4 mg/dL                             Bili Direct               0.2 mg/dL                              Bili Indirect             0.2 mg/dL                             AST                       55 unit/L  HI                             ALT                       13 unit/L                             Alk Phos                  291 unit/L  HI                             Total Protein             5.9 gm/dL  LOW                             Albumin Lvl               2.3 gm/dL  LOW                             Globulin                  3.60 gm/mL  HI                             A/G Ratio                 1 ratio                             NT pro BNP.               1,447 pg/mL  HI                             Total CK                  25 unit/L  LOW                             CK MB                     <1.0 ng/mL  LOW                             PT                        14.2 second(s)                             INR                       1.12                             PTT                       36.2 second(s)                             WBC                       35.1 x10(3)/mcL  CRIT                             RBC                       1.62 x10(6)/mcL  LOW                             Hgb                       5.1 gm/dL  CRIT                             Hct                       16.4 %  CRIT                             Platelet                  341 x10(3)/mcL                             MCV                       101.2 fL  HI                             MCH                       31.5 pg                             MCHC                      31.1 gm/dL                             RDW                       19.2 %  HI                             MPV                       10.5 fL  HI                             Abs Neut                  27.84 x10(3)/mcL  HI                             Segs Man                  85 %  HI                             Band Man                  2 %                             Lymph Man                 7.0 %  LOW                             Monocyte Man              2 %                             Eos Man                    4 %                             Basophil Man              0 %                             NRBC Man                  1 %  NA                             Hypochrom                 1+                             Platelet Est              Clumped, normal                             Anisocyte                 1+                             Poik                      1+                             Microcyte                 1+                             Macrocyte                 1+                             Polychrom                 2+                             RBC Morph                 Abnormal                             Schistocyte               1+                             Spherocyte                1+  .      Reexamination/ Reevaluation   Time: 12/26/2017 15:01:00 .   Notes: GYN has seen and evaluated the patient.  Please see consultation note.  At this time, patient just has bloody mucus but no profuse vaginal bleeding.  Due to elevated white count and low hemoglobin/hematocrit, recommend internal medicine evaluate the patient and admit for transfusion.  Patient reported a history of abnormal pap smear 25 years prior, and also mentions some discussion of a possible cancer in the past.  GYN has recommended CT chest abdomen pelvis for evaluation.  Internal medicine has been consults it for admission.    Patient is not toxic appearing.  She does have elevated white count of 35,000.  Will obtain a lactic acid.  Currently no source of infection..      Impression and Plan   Diagnosis   Abnormal uterine bleeding (AUB) ([id number] N93.9)   Symptomatic anemia (RAN94-TB D64.9)      Calls-Consults   -  12/26/2017 14:14:00 , GYN, consult.    -  12/26/2017 15:00:00 , Internal Medicine, consult.    Plan   Condition: Stable.    Disposition: Admit time  12/26/2017 15:07:00, Place in Observation Telemetry Unit.

## 2022-04-30 NOTE — DISCHARGE SUMMARY
Patient:   Stephania Cook             MRN: [id number]            FIN: [id number]               Age:   77 years     Sex:  Female     :  1940   Associated Diagnoses:   Abnormal uterine bleeding (AUB); Weakness; Symptomatic anemia; Impaired functional mobility, balance, gait, and endurance; Acute cystitis; Abnormal uterine and vaginal bleeding, unspecified; Elevated white blood cell count   Author:   Christine Petersen MD      Results Review   General results   Most recent results   All   2017 5:45 CST      WBC                       26.0 x10(3)/mcL  HI                             RBC                       2.68 x10(6)/mcL  LOW                             Hgb                       8.4 gm/dL  LOW                             Hct                       24.9 %  LOW                             Platelet                  260 x10(3)/mcL                             MCV                       92.9 fL                             MCH                       31.3 pg                             MCHC                      33.7 gm/dL                             RDW                       17.8 %  HI                             MPV                       10.6 fL  HI                             Abs Neut                  20.64 x10(3)/mcL  HI                             Neutro Auto               80 x10(3)/mcL  NA                             Lymph Auto                7 %  LOW                             Mono Auto                 6 %                             Eos Auto                  5 %                             Abs Eos                   1.32  NA                             Basophil Auto             0 %                             Abs Neutro                20.64 x10(3)/mcL  NA                             Abs Lymph                 1.84 x10(3)/mcL  NA                             Abs Mono                  1.70 x10(3)/mcL  NA                             Abs Baso                  0.10 x10(3)/mcL  NA                             IG%                        1 %  NA                             IG#                       0.3700  NA                             PT                        15.1 second(s)  HI                             INR                       1.21  HI                             PTT                       32.5 second(s)                             Sodium Lvl                137 mmol/L                             Potassium Lvl             4.5 mmol/L                             Chloride                  100 mmol/L                             CO2                       27 mmol/L                             Calcium Lvl               7.7 mg/dL  LOW                             Glucose Lvl               116 mg/dL  HI                             BUN                       21 mg/dL  HI                             Creatinine                0.80 mg/dL                             eGFR-AA                   90 mL/min                             eGFR-LINDA                  74 mL/min  LOW                             Bili Total                1.1 mg/dL  HI                             Bili Direct               0.4 mg/dL  HI                             Bili Indirect             0.7 mg/dL                             AST                       43 unit/L  HI                             ALT                       11 unit/L  LOW                             Alk Phos                  248 unit/L  HI                             Total Protein             5.4 gm/dL  LOW                             Albumin Lvl               2.0 gm/dL  LOW                             Globulin                  3.40 gm/mL                             A/G Ratio                 1 ratio    12/26/2017 13:45 CST     WBC                       35.1 x10(3)/mcL  CRIT                             RBC                       1.62 x10(6)/mcL  LOW                             Hgb                       5.1 gm/dL  CRIT                             Hct                       16.4 %  CRIT                             Platelet                   341 x10(3)/mcL                             MCV                       101.2 fL  HI                             MCH                       31.5 pg                             MCHC                      31.1 gm/dL                             RDW                       19.2 %  HI                             MPV                       10.5 fL  HI                             Abs Neut                  27.84 x10(3)/mcL  HI                             Segs Man                  85 %  HI                             Band Man                  2 %                             Lymph Man                 7.0 %  LOW                             Monocyte Man              2 %                             Eos Man                   4 %                             Basophil Man              0 %                             NRBC Man                  1 %  NA                             Hypochrom                 1+                             Platelet Est              Clumped, normal                             Anisocyte                 1+                             Poik                      1+                             Microcyte                 1+                             Macrocyte                 1+                             Polychrom                 2+                             RBC Morph                 Abnormal                             Schistocyte               1+                             Spherocyte                1+                             PT                        14.2 second(s)                             INR                       1.12                             PTT                       36.2 second(s)                             Sodium Lvl                135 mmol/L  LOW                             Potassium Lvl             4.9 mmol/L                             Chloride                  99 mmol/L                             CO2                       28 mmol/L                             Calcium Lvl                8.6 mg/dL                             Glucose Lvl               150 mg/dL  HI                             BUN                       26 mg/dL  HI                             Creatinine                1.00 mg/dL                             eGFR-AA                   69 mL/min  LOW                             eGFR-LINDA                  57 mL/min  LOW                             Bili Total                0.4 mg/dL                             Bili Direct               0.2 mg/dL                             Bili Indirect             0.2 mg/dL                             AST                       55 unit/L  HI                             ALT                       13 unit/L                             Alk Phos                  291 unit/L  HI                             Total Protein             5.9 gm/dL  LOW                             Albumin Lvl               2.3 gm/dL  LOW                             Globulin                  3.60 gm/mL  HI                             A/G Ratio                 1 ratio                             NT pro BNP.               1,447 pg/mL  HI                             Total CK                  25 unit/L  LOW                             CK MB                     <1.0 ng/mL  LOW                             Respirations              Unlabored                             Respiratory Pattern       Regular                             All Lobes Breath Sounds   Clear                             Heart Rhythm              Regular                             Genitourinary Symptoms    Vaginal discharge                             Genital Discharge/Drainage                Blood clots, Bloody                             [place] Count            15        Reason For Exam  Menopausal and Post Menopausal Disorder    Radiology Report  History.  Postmenopausal bleeding     Reference.  No priors     Findings.  Transabdominal and transvaginal scanning of the pelvis.      Anteverted uterus is enlarged  measuring 17 cm in length. The  endometrium is not clearly delineated, suspect endometrial thickening.     Neither ovary seen. No solid adnexal mass or free fluid.     Impression.  Enlarged uterus with poorly defined likely thickened endometrium.  Correlation with endometrial biopsy needed.     Reason For Exam  Postmenopausal vaginal bleeding; Concern for cervical cancer; Eval for metastatic disease;Other (please specify)    Radiology Report  CT of chest with contrast:     Indication: Weakness, postmenopausal bleeding     FINDINGS: Continuous axial images were performed through the chest  from the level of the lung apices to the lung bases following  administration of IV contrast. Images are displayed in soft tissue and  pulmonary window settings. Reformatted coronal and sagittal images  were also performed.     The lungs are clear bilaterally except for mild scarring located  laterally in the right lower lobe. No pleural effusion or suspicious  pulmonary nodules are identified. No pathologic lymph node enlargement  is visible in the pulmonary donnie, mediastinum or axilla bilaterally.  Heart size is normal. No chest wall abnormalities are appreciated.        CT of abdomen and pelvis with contrast:     FINDINGS: Continuous axial images were performed through the abdomen  and pelvis following administration of IV contrast. No oral contrast  was given. Reformatted coronal and sagittal images were also obtained.  No prior studies are available for comparison.     The liver is normal in size. There is a pattern of enhancement  including a large draining vein identified anteriorly in the lateral  segment of the left hepatic lobe consistent with an incidental  vascular malformation producing apparent associated arteriovenous  shunting. The spleen appears normal except for an incidental calcified  granuloma. The pancreas, gallbladder and adrenals appear normal. The  kidneys opacify symmetrically with intravenous contrast  demonstrating  no evidence of urinary obstruction or focal parenchymal abnormality.  No free intraperitoneal fluid or lymph node enlargement is identified.  A normal appendix is seen in the right lower quadrant.     Images obtained through the pelvis demonstrate a Butt catheter in a  collapsed urinary bladder. There is an enlarged uterus measuring 18.2  cm in length with marked thickening of the endometrium measuring 7.3  cm in diameter. Its appearance is consistent with either endometrial  hyperplasia or neoplasm or complex fluid suggesting retained  hemorrhage within the endometrial cavity (hematometra) associated with  an obstructing lesion at the level of the uterine cervix. Correlation  with clinical findings would be helpful. The uterine cervix appears  prominent although no evidence of pelvic adenopathy is appreciated and  the tissue planes between the cervix and surrounding structures and  pelvic sidewalls are clear except for an indistinct margin between the  anterior wall of the lower segment of the uterus and urinary bladder  felt to be associated with the collapsed bladder. No free pelvic fluid  is visible. The ovaries appear normal for patient age.     IMPRESSION:  1. Gross enlargement of the uterus and thickened endometrium which may  represent endometrial hyperplasia/neoplasm versus retained hemorrhage  in the endometrial cavity secondary to an obstructing process at the  level of the uterine cervix.  2. No free pelvic fluid, adnexal masses or pelvic adenopathy are  identified.  3. 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  4. No acute pulmonary disease or adenopathy is identified in the  chest.         Discharge Information      Discharge Summary Information   Admitted  12/26/2017   Discharged  12/27/2017   Admitting physician     Praveena Gilbert MD.     Admitting diagnosis (Symptomatic anemia, postmenopausal bleeding)    Discharge diagnosis     Weakness (ZFN06-PT R53.1).     Elevated white blood cell count (AVF94-LX D72.82).     Impaired functional mobility, balance, gait, and endurance (RVN60-MT Z74.09).     Acute cystitis (YDA24-JF N30.0).     Abnormal uterine and vaginal bleeding, unspecified (DSV13-IT N93.9).        Physical Examination   Vital Signs   12/27/2017 15:23 CST     Temperature Oral          37.0 DegC                             Temperature Oral (calculated)             98.60 DegF                             Peripheral Pulse Rate     89 bpm                             Respiratory Rate          17 br/min                             SpO2                      97 %                             Oxygen Therapy            Room air                             Systolic Blood Pressure   116 mmHg                             Diastolic Blood Pressure  72 mmHg                             Mean Arterial Pressure, Cuff              87 mmHg    12/26/2017 13:34 CST     Temperature Oral          36.5 DegC                             Temperature Oral (calculated)             97.70 DegF                             Peripheral Pulse Rate     93 bpm                             Respiratory Rate          22 br/min                             Oxygen Therapy            Room air                             Systolic Blood Pressure   123 mmHg                             Diastolic Blood Pressure  78 mmHg     General: in no apparent distress.  HEENT: EOMs intact, mucosa is moist.  Respiratory: unlabored breathing, CTAB with no crackles or wheezes.  Cardiovascular: RRR with no murmurs.  Gastrointestinal: soft, nontender, no guarding or rebound.  Integumentary: warm, dry, no rash.  : Butt cath in place, moderate amount of blood per pad.   Neurologic: alert and oriented, no focal deficits.          Hospital Course   Hospital Course   Admitted from: from emergency department.     Admitting diagnosis: Abnormal uterine bleeding (AUB) (FRZ92-LB N93.9),  Symptomatic anemia (JEM87-II D64.9), Weakness (SVM09-ET R53.1).     Admission disposition: admit to medical bed.     Length of stay: days  1.     Pt is a pleasant 79 year old female with HTN and CHF who complains of heavy vaginal bleeding with clots for the past 6 days. Pt and her daughter state that the patient has been getting lightheaded over the past few days when changing from a sitting to standing position as well as worsening LOO than normal, and has moments where she passes out for 2-3 seconds and then regains consciousness. Denies micturition or falls during these episodes. Pt has been worked up for post-menopausal bleeding around 25 years ago along with an abnormal pap smear; pt states she did not follow up for this problem when went to be evaluated 5 years ago as she did not like the physician. She has never been admitted for blood transfusions in the past. Takes medications for her CHF of unknown EF% consistently. Pt denies N/V, F/C, Diarrhea, Constipation. Pt normally compliant with Lasix, but has not taken in 3 days as she did not want to get up to urinate due to her SOB. Patient was found to have a critical Hgb of 5.     GYN was consulted; per report from ED physician, GYN service performed and found fungating mass filling the vaginal vault w/abnormal cervical structure palpated; currently small amt of bloody mucous and decreased flow compared to pt's previous history of heavy bleeding. Findings very suspicious for cervical cancer. US and CT were done to assess pelvic anatomy as well as extent of possibe disease/mets. A Butt catherter was inserted due to fall risk as patient was very dizzy. Patient was admitted for symptomatic anemia and was subsequently given 3 units of packed red blood cells. Patient improved symptomatically and repeat CBC showed appropriate increase in H/H. Patient was transported to GYN clinic for a biopsy of the mass as well as repeat examination for staging. Biopsy results will  be followed up by GYN.  Urine cx grew 50k-75k gram negative rods with a positive UA for UTI, however patient was asymptomatic. Butt catheter was removed. A prescription for Macrobid was given to patient for UTI and instructions were provided on how to take the medication. She was also found to have leukocytosis without fever. Blood cx have been negative x 1 d. Will follow-up leukocytosis in clinic post discharge. Likely due to UTI, however, other causes cannot be ruled out. Patient has follow-up appointment with GYN made. PT was consulted for evaluation of patient's weakness/unsteady gait. A rolling walker was prescribed to patient prior to discharge. Case management was also consulted for evaluation for home health services.       Discharge Plan   Discharge Summary Plan   Discharge Status: improved.     Discharge instructions given: to patient, to caregiver.     Discharge disposition: discharge to home with home health care.     Prescriptions: written and given to patient.

## 2022-05-02 NOTE — HISTORICAL [facility] CERNER
This is a historical note converted from [employer]. Formatting and pictures may have been removed.  Please reference [employer] for original formatting and attached multimedia. Chief Complaint  pt here for bright red vaginal bleeding with clots, today pt began with weakness and near syncope  Reason for Consultation  postmenopausal bleeding  History of Present Illness  76yo  postmenopausal female presents with 5 days of heavy vaginal bleeding and weakness with near-syncopal episodes. Bleeding?has decreased to spotting today.?Patient states that she has had 3 such episodes since age 51, did not seek medical care with the most recent episode several years ago.? Has never required a transfusion previously and does not have?any vaginal spotting between these bleeds. ?Patient also states that she was told she had?an abnormal Pap smear many years ago but did not?seek further care because?God will take her when it is her time to go. She denies any abdominal pain, nausea or vomiting.  ?   OB History:?3 uncomplicated vaginal deliveries  GYN history:?As stated above  Surgical history:?Right lumpectomy.  Medical history:?Hypertension, congestive heart failure, does not require home oxygen  Social: 20 pack-year smoking history, quit over 20 years ago. Drinks a fifth of whisky every 2 days, denies h/o withdrawals ?  Review of Systems  Positive?for dizziness, weakness, near syncope  Positive?for chronic SOB, denies CP  Negative for cough or?wheezing  Negative for abdominal pain, nausea, vomiting, diarrhea  Physical Exam  Vitals & Measurements  T:?36.5? ?C ?(Oral)? HR:?92?(Peripheral)? RR:?19? BP:?171/84? SpO2:?100%? WT:?136.5?kg?  General: NAD, A/Ox3, pale, weak  HEENT: PERRL, pale conjunctiva, neck supple  Respiratory: non-labored breathing, no cough tachypnea  Cardiovascular: RRR, pale nail beds  Abdomen: soft, obese, nondistended, nontender to palpation  Extremities: no edema, no calf tenderness  External genitalia: Normal  female anatomy, slightly erythematous lateral to labia majora. Normal appearing urethral meatus. Normal appearing external anus.  Sterile speculum exam: Fungating?mass filling vaginal vault.? No visible?normal cervix.? Copious amount of?bloody mucus?in vault and on pad.? No active bright red bleeding.  Bimanual exam: Orange-sized?fungating mass palpable?at?the apex. No normal cervix palpable.  Assessment/Plan  78yo  postmenopausal female presents with 5 days of heavy vaginal bleeding and weakness with near-syncopal episodes, fungating cervical mass on exam.  1. Vaginal bleeding: Currently minimal, but patient with symptomatic anemia. ?Given her multiple comorbidities and age, will defer to internal medicine team for management. ?Will refrain from medical management at this time given that bleeding has?abated. ?Please reconsult if heavy vaginal bleeding resumes. ?Recommend pad counts.  2. Cervical mass: Discussed with patient and daughter that this is most concerning for cancer.? Patient expressed understanding?and is also uncertain if she desires?any treatment.? She agreed to see us in clinic for further workup. ?Recommend transvaginal ultrasound and CT chest abdomen pelvis?while here in the hospital, and we will see her as an outpatient for further discussion of management and possible biopsies.  ?   Thank you for this consult.  ?   Sola Welch MD  [facility]  ?  ?   Problem List/Past Medical History  Ongoing  No qualifying data  Historical  Medications  Inpatient  No active inpatient medications  Home  CARVEDILOL 12.5 MG TABLET, 12.5 mg= 1 tab(s), Oral, BID  CLONAZEPAM 0.5 MG TABLET  FUROSEMIDE 20 MG TABLET, 20 mg= 1 tab(s), Oral, Daily  ISOSORBIDE [place] ER 30 MG TABLET, 30 mg= 1 tab(s), Oral, Daily  Allergies  No Known Allergies  Social History  Alcohol - 2017  Current, 1-2 times per month

## 2022-05-02 NOTE — HISTORICAL [facility]ER
This is a historical note converted from Cerlennie. Formatting and pictures may have been removed.  Please reference Cerlennie for original formatting and attached multimedia. Admit and Discharge Dates  Admit Date: 06/17/2021  Discharge Date: 06/18/2021  Physicians  Attending Physician - Kyler MCCLENDON, Enedelia BONDS  Admitting Physician - Kyler MCCLENDON, Enedelia BONDS  Consulting Physician - Provost MCCLENDON, Jessica Whittington  Primary Care Physician - No PCP, No  Discharge Diagnosis  Abnormal vaginal bleeding?N93.9  Anemia requiring transfusions?D64.9  Cervical cancer?C53.9  Hyperkalemia?E87.5  Hyponatremia?E87.1  Weakness or fatigue?[id number]  Surgical Procedures  No procedures recorded for this visit.  Immunizations  No immunizations recorded for this visit.  Admission Information  Patient is a 81-year-old morbidly obese  female with a past medical history of endometrioid carcinoma diagnosed in 2017 comes to [facility] ED because of weakness and being numb all over her body.? According to the patient she drank a lot of fluids?prior to the day of admission.? While on her way to the bathroom she felt weak, slipped and fell.?Denies of hitting her head.?She called EMS yesterday.?EMS lifted her up from the ground to the chair.? She felt better and did not come to the hospital on 6/16/21.? When she woke up in the morning 6/17/21?she felt weak and wanted to visit hospital.? Upon asking in detail patient says that she has been bleeding vaginally?on and off since years.? She uses diapers for vaginal bleed.? She was diagnosed of endometrioid carcinoma in 2017 but did not want to get any surgery regarding the same.??She also complains of?straining in stools?for constipation.??Denies of blood in stool.? I called the daughter for further updates.? Daughter reports that her mother has severe anxiety issues.? Her cane slipped on a plastic bag, she lost balance and fell.? She also expressed her mother never wants to be on life support.?  She confirmed that her mother is DNR/DNI.? She also has put her mother on strict no sodium diet since long time.? She gives her over-the-counter fluid pills which usually works for her.? She does not have any PCP.? Per chart review-on 12/27/2017 GYN notes says that they found fungating mass filling the vaginal vault with abnormal cervical structure, findings suspicious for cervical cancer.? Biopsy reveals endometrioid carcinoma.? Patients daughter and patient did not want any intervention as she is old at that point of time. ? Patient denies of any shortness of breath( not any more), chest pain, palpitations, dizziness, abdominal pain, nausea, vomiting,?hemoptysis,?melena.?  ?   ED course  Patients blood pressure was labile 91/42, map of 58 on arrival.? She is stating 100% on room air, respiratory rate 22, heart rate 88.? As of now her blood pressure is 108/57, map of 74 and she is currently stable.? Labs demonstrated as follows  Hyponatremia-127, hyperkalemia 5.6, hypomagnesemia 1.6, BNP elevated 174 (baseline BMP in last admission was 1447), A1C- 5.7, no elevation in troponin, TSH/T4 within normal limit, no leukocytosis (9.6), low H&H 6.3/ 23.7, low MCV, Covid negative.? FOBT?negative in ED. CT head?was negative?for any intracranial bleed.?Ultrasound pelvis demonstrates markedly enlarged uterus with overall poor characterization likely secondary to artifact that may be related to possible diffuse malignant process involving the uterus given to the patients history.  Hospital Course  Patient was admitted to telemetry for symptomatic anemia secondary to cervical mass and vaginal bleed,? iron deficiency anemia, hyponatremia, hypomagnesemia and hyperkalemia.? She was given 2 units of blood after type and cross.? Posttransfusion H&H was 8.5/29.4.? Her electrolytes were corrected.? Transthoracic echo demonstrated an EF of 60 to 65%.? For hyponatremia patient was given 500 bolus of normal saline, fluid restriction to  less than 1 L a day.? During inpatient she was found to have volume overload.? She was given a dose of 40 mg of IV Lasix which corrected her sodium from 1 26 to1 29.? She also have a potassium of 3.3 which was collected with 40 mEq of oral potassium chloride.? As of today during discharge patient is very stable.? We offered her to go home with hospice.? Patient and daughter both declined for the same as of now.? We also made referral to oncology.? DVT Study was negative.? TTE demonstrated EF OF 60%. Was discharged home on?ferrous sulfate and Lasix.  Significant Findings  Labs Last 24 Hours?  ?Chemistry? Hematology/Coagulation?   Sodium Lvl:?129 mmol/L?Low (06/18/21 18:00:00) WBC:?11.7 x10(3)/mcL?High (06/18/21 04:10:00)   Potassium Lvl:?3.3 mmol/L?Low (06/18/21 18:00:00) RBC: 4.11 x10(6)/mcL (06/18/21 04:10:00)   Chloride:?90 mmol/L?Low (06/18/21 18:00:00) Hgb:?8.5 gm/dL?Low (06/18/21 04:10:00)   CO2: 28 mmol/L (06/18/21 18:00:00) Hct:?29.4 %?Low (06/18/21 04:10:00)   Calcium Lvl:?8 mg/dL?Low (06/18/21 18:00:00) Platelet: 255 x10(3)/mcL (06/18/21 04:10:00)   Magnesium Lvl: 1.8 mg/dL (06/18/21 06:33:00) MCV:?71.5 fL?Low (06/18/21 04:10:00)   Glucose Lvl:?141 mg/dL?High (06/18/21 18:00:00) MCH:?20.7 pg?Low (06/18/21 04:10:00)   BUN:?8.2 mg/dL?Low (06/18/21 18:00:00) MCHC:?28.9 gm/dL?Low (06/18/21 04:10:00)   Creatinine: 0.77 mg/dL (06/18/21 18:00:00) RDW:?22.4 %?High (06/18/21 04:10:00)   Est Creat Clearance Ser: 48.19 mL/min (06/18/21 18:52:17) MPV: 9.3 fL (06/18/21 04:10:00)   BUN/Creat Ratio: 11 (06/18/21 18:00:00) Abs Neut: 8.77 x10(3)/mcL (06/18/21 04:10:00)   eGFR-AA: 93 (06/18/21 18:00:00) Neutro Auto: 75 % (06/18/21 04:10:00)   eGFR-LINDA:?77 mL/min/1.73 m2?Low (06/18/21 18:00:00) Lymph Auto: 10 % (06/18/21 04:10:00)   Bili Total: 1 mg/dL (06/18/21 04:10:00) Mono Auto: 6 % (06/18/21 04:10:00)   Bili Direct: 0.5 mg/dL (06/18/21 04:10:00) Eos Auto: 8 % (06/18/21 04:10:00)   Bili Indirect: 0.5 mg/dL (06/18/21  04:10:00) Abs Eos:?1 x10(3)/mcL?High (06/18/21 04:10:00)   AST:?41 unit/L?High (06/18/21 04:10:00) Basophil Auto: 1 % (06/18/21 04:10:00)   ALT: 20 unit/L (06/18/21 04:10:00) Abs Neutro: 8.77 x10(3)/mcL (06/18/21 04:10:00)   Alk Phos:?281 unit/L?High (06/18/21 04:10:00) Abs Lymph: 1.1 x10(3)/mcL (06/18/21 04:10:00)   Total Protein: 5.8 gm/dL (06/18/21 04:10:00) Abs Mono: 0.7 x10(3)/mcL (06/18/21 04:10:00)   Albumin Lvl:?2.9 gm/dL?Low (06/18/21 04:10:00) Abs Baso: 0.1 x10(3)/mcL (06/18/21 04:10:00)   Globulin: 2.9 gm/dL (06/18/21 04:10:00) NRBC%: 0.2 (06/18/21 04:10:00)   A/G Ratio:?1 ratio?Low (06/18/21 04:10:00) IG%: 0 % (06/18/21 04:10:00)   Iron Lvl:?13 ug/dL?Low (06/18/21 04:10:00) IG#: 0.04 (06/18/21 04:10:00)   Transferrin: 250 mg/dL (06/18/21 04:10:00)    TIBC: 274 ug/dL (06/18/21 04:10:00)    Iron Sat:?5 %?Low (06/18/21 04:10:00)    Ferritin Lvl: 8.24 ng/mL (06/18/21 04:10:00)    UIBC: 261 ug/dL (06/18/21 04:10:00)    U Creatinine:?16.4 mg/dL?Low (06/18/21 11:50:00)    U Sodium: 84 mmol/L (06/18/21 11:50:00)    FESodium%: 3 % (06/18/21 11:50:00)    ?Accession:?[id number]  Order:?CT Abdomen and Pelvis W W/O Contrast  Report Dt/Tm:?06/17/2021 16:07  Report:?  CT ABDOMEN and PELVIS WITHOUT and WITH intravenous contrast  enhancement and 2-D reformations  ?  HISTORY: Other (please specify) ?hematuria?  ?  As per PQRS measures, all CT scans at this facility used dose  modulation, iterative reconstruction, and/or weight based dose  adjustment when appropriate to reduce radiation dose to as low as  reasonably achievable.  ?  IMPRESSION:?  Comparison December 2017  ?  Left hepatic presumed vascular lesion reidentified. Smaller  circumscribed hepatic hypodensities similar prior exam. Spleen stable  size. 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 Adrenal glands and pancreas without adverse  change  ?  Kidneys without hydronephrosis. Small circumscribed right  renal  hypodensities are obscured by motion artifact, appears slightly larger  indeterminate. no overt urographic filling defect. Urinary bladder  partially distended. 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  ?  The uterus is markedly enlarged perhaps slightly increasing in size up  to 20 cm length.  ?  No overtly enlarged lymph nodes.  ?  No obstructive bowel findings. Fat-containing left spigelian hernia  reidentified.  ?  ?  ?  ?  Accession:?[id number]  Order:?US Pelvic Non-OB w Transvag if needed  Report Dt/Tm:?06/17/2021 14:25  Report:?  TECHNIQUE: Transabdominal images were obtained. Transvaginal images  were deferred because of difficulty positioning the patient.  ?  HISTORY: Untreated cervical cancer, vaginal bleeding  ?  COMPARISON: CT abdomen 12/26/2017?  ?  FINDINGS: The uterus measures 18.6 x 9.4 x 9.7 cm. The internal  architecture of the uterus is heterogeneous with poor visualization.  This poor visualization is likely secondary to both the lack of  transvaginal images as well as artifact that may be related to a  possible diffuse malignant process involving the uterus given the  patients history.  ?  Neither ovary was definitively seen. This is most likely secondary to  their small size, bowel gas artifact, and the lack of transvaginal  images.  ?  There is no free fluid in the visualized pelvis.  ?  ?  IMPRESSION:?  ?  1. Markedly enlarged uterus with overall poor characterization as  above.  ?  2. Neither ovary seen as above.  ?  Accession:?[id number]  Order:?XR Chest 2 Views  Report Dt/Tm:?06/17/2021 11:44  Report:?  ?  History:  Adenopathy  ?  Reference:  No priors  ?  Findings:  PA and lateral views of the chest were obtained. Heart is enlarged and  there is aortic atherosclerosis. There are some bilateral hilar  prominence. The lungs are grossly clear. No pneumothorax or  significant effusion.  ?  Impression:?  Bilateral hilar  prominence.  ?  ?  ?  ?  ?  Accession:?[id number]  Order:?XR Spine Lumbar 2 or 3 Views  Report Dt/Tm:?06/17/2021 11:41  Report:?  EXAMINATION  XR Spine Lumbar 2 or 3 Views  ?  INDICATION  Numbness  ?  Comparison: None available  ?  FINDINGS  There are 5 nonrib-bearing lumbar-type vertebral bodies with an  additional transitional type lumbosacral vertebral body labeled as S1.  Alignment is preserved without subluxation. The vertebral body heights  are maintained. There is moderate disc height loss at L5-S1, . Levels  with small multilevel marginal osteophytes. Mild facet arthropathy is  present in a lower lumbar spine. Vascular calcifications are noted.  ?  IMPRESSION  1. ?No acute abnormality identified.  2. ?Mild degenerative changes of the lumbar spine.  ?  Accession:?[id number]  Order:?XR Spine Thoracic 3 Views  Report Dt/Tm:?06/17/2021 11:41  Report:?  XR Spine Thoracic 3 Views  ?  HISTORY: Numbness  ?  COMPARISON: None.  ?  FINDINGS:  There is a mild rightward curvature of the thoracic spine. The  visualized vertebral body heights are maintained. Multilevel disc  height loss and marginal osteophyte formation. Vascular calcifications  are noted. The soft tissues are otherwise unremarkable.  ?  ?  IMPRESSION:  1. ?No acute abnormality identified.  2. ?Multilevel degenerative changes of the thoracic spine.  ?  ?  Accession:?[id number]  Order:?XR Spine Cervical 2 or 3 Views  Report Dt/Tm:?06/17/2021 11:38  Report:?  XR Spine Cervical 2 or 3 Views  ?  HISTORY: Numbness  ?  COMPARISON: None.  ?  FINDINGS:  There is reversal normal cervical lordosis with minimal retrolisthesis  of C3 over C4. The visualized body heights are maintained. There is  moderate disc height loss from C3 through C7 with marginal osteophyte  formation. There is bilateral facet arthropathy. The soft tissues are  unremarkable.  ?  ?  IMPRESSION:  1. ?No acute abnormality identified.  2. ?Moderate degenerative changes of the cervical  spine.  ?  ?  Accession:?[id number]  Order:?CT Head W/O Contrast  Report Dt/Tm:?06/17/2021 11:27  Report:?  ?  Clinical History:  Head injury  ?  Reference:  None Available.  ?  Technique:  CT imaging of the head performed from the skull base to the vertex  without intravenous contrast. DLP 1196 mGycm. Automatic exposure  control, adjustment of mA/kV or iterative reconstruction technique was  used to reduce radiation.  ?  Findings:  There is no acute cortical infarct, hemorrhage or mass lesion. There  is mild patchy hypoattenuation in the cerebral white matter which is  nonspecific but most commonly associated with chronic small vessel  ischemic changes. The ventricles are not significantly enlarged. There  are vascular calcifications.  ?  Visualized paranasal sinuses and mastoid air cells are clear.  ?  Impression:  No acute intracranial findings.  ?  ?  Time Spent on discharge  >30? min  Objective  Vitals & Measurements  T:?36.7? ?C (Oral)? TMIN:?36.6? ?C (Oral)? TMAX:?36.7? ?C (Oral)? HR:?96(Peripheral)? HR:?96(Monitored)? RR:?18? BP:?129/68? SpO2:?100%?  Physical Exam  General:?Elderly  female morbidly obese lying?on the bed?sitting on the chair playing Candy crush in her iPad  Respiratory:?CTA bilaterally  Cardiovascular:?regular rate and rhythm without murmurs. B/l lower ext edema pitting +1.  Gastrointestinal:?soft, non-tender, non-distended with normal bowel sounds,?cannot feel for masses because of morbid obesity  Musculoskeletal:??Normal strength and tone  Psychological-anxious?and talkative  Patient Discharge Condition  Stable  Discharge Disposition  Home   Discharge Medication Reconciliation  Prescribed  ferrous sulfate (ferrous sulfate 325 mg oral tablet)?325 mg, Oral, BID  furosemide (furosemide 40 mg oral tablet)?40 mg, Oral, Daily  Continue  Misc Prescription (Rolling walker)?See Instructions  carvedilol (CARVEDILOL 12.5 MG TABLET)?12.5 mg, Oral, BID  clonazePAM (CLONAZEPAM 0.5 MG  TABLET)  isosorbide mononitrate (ISOSORBIDE MN ER 30 MG TABLET)?30 mg, Oral, Daily  levothyroxine (levothyroxine 75 mcg (0.075 mg) oral tablet)?75 mcg, Oral, Daily  Education and Orders Provided  Heart Failure  Discharge - 06/18/21 18:57:00 CDT, Home, discharge AFTER giving potassium?  Follow up  [facility], within 2 to 4 weeks  Car Seat Challenge  No Qualifying Data

## 2022-05-02 NOTE — HISTORICAL [facility]
This is a historical note converted from Cerlennie. Formatting and pictures may have been removed.  Please reference Cerlennie for original formatting and attached multimedia. Chief Complaint  pt here for bright red vaginal bleeding with clots, today pt began with weakness and near syncope  History of Present Illness  Pt is a pleasant 79 year old female with HTN and CHF who complains of heavy vaginal bleeding with clots for the past 6 days. Pt and her daughter state that the patient has been getting lightheaded over the past few days when changing from a sitting to standing position as well as worsening LOO than normal, and has moments where she passes out for 2-3 seconds and then regains consciousness. Denies micturition or falls during these episodes. Pt has been worked up for post-menopausal bleeding around 25 years ago along with an abnormal pap smear; pt states she did not follow up for this problem when went to be evaluated 5 years ago?as she did not like the physician. She has never been admitted for blood transfusions in the past. Takes medications for her CHF of unknown EF% consistently. Pt denies N/V, F/C, Diarrhea, Constipation. Pt normally compliant with Lasix, but has not taken in 3 days as she did not want to get up to urinate due to her SOB.  ?  GYN has been consulted; per report from ED physician, GYN service performed and found fungating mass filling the vaginal vault w/ abnormal cervical structure palpated; currently small amt of bloody mucous and decreased flow compared to pts previous history of heavy bleeding.  ?  OBGYN: ; Vaginal Delivery x 3 w/o complication  GYN Hx: As above  PMH: HTN, CHF  SocHx: 20+ pack year hx; quit ~20 yrs ago. Drinks fifth liquor q2days. Last drink: 4 days ago.  Meds: Coreg 12.5mg BID, Lasix 20mg QD, Isosorbide MN 30mg QD  Review of Systems  See HPI for pertinent positives and negatives. 12 point ROS is otherwise negative.  Physical Exam  Vitals &  Measurements  T:?36.5? ?C ?(Oral)? HR:?92?(Peripheral)? RR:?19? BP:?171/84? SpO2:?100%? WT:?136.5?kg?  General:?NAD. Pleasant.  HEENT:?N/AT, conjunctival pallor present, pale tongue?no blood around nares, EOMI  Neck:?Supple, no lymphadenopathy  Respiratory:?LCTAB, no crackles of wheezes  Cardiovascular:?RRR, no murmur or gallop appreciated on auscultation, capillary refill 3s+ over UE nail beds  Gastrointestinal:?Soft, nontender, normal bowel sounds, no organomegaly  Musculoskeletal:?Normal ROM grossly; no peripheral edema  Integumentary:?Warm, pink, no pallor  Neurologic:?AAOx3, normal sensation  Psychiatric: Appropriate mood and affect, normal judgment  ?  Assessment/Plan  1. Post-menopausal uterine bleeding: Gyn Physcial exam and GynHx strongly suggests cervical cancer. Will be worked up outpt.  2. Symptomatic Iron deficiency/blood loss?Anemia: Hgb 5.1. ?Transfuse 3 PRBC today. Will follow Hgb 6hrs after last transfusion.  3. CHF: Lasix 20mg QD, Coreg 12.5mg BID  4. HTN: As above; Isosorbide 30mg QD  ?  Seizure prophylaxis: Clonazepam 0.5mg QD  DVT Prophyl: Holding at this time  ?  Getting CT Thorax/Abd/Pelvis.  ?  Disposition: Admit to medicine unit for blood transfusion. Plan to D/C when stable.   Problem List/Past Medical History  Ongoing  No qualifying data  Historical  Medications  Inpatient  No active inpatient medications  Home  CARVEDILOL 12.5 MG TABLET, 12.5 mg= 1 tab(s), Oral, BID  CLONAZEPAM 0.5 MG TABLET  FUROSEMIDE 20 MG TABLET, 20 mg= 1 tab(s), Oral, Daily  ISOSORBIDE MN ER 30 MG TABLET, 30 mg= 1 tab(s), Oral, Daily  Allergies  No Known Allergies  Social History  Alcohol - 12/26/2017  Current, 1-2 times per month  Lab Results  Labs Last 24 Hours?  ?Chemistry Hematology/Coagulation   Sodium Lvl:?135 mmol/L?Low (12/26/17 14:24:40) PT: 14.2 second(s) (12/26/17 15:00:46)   Potassium Lvl: 4.9 mmol/L (12/26/17 14:24:40) INR: 1.12 (12/26/17 15:00:46)   Chloride: 99 mmol/L (12/26/17 14:24:40) PTT: 36.2  second(s) (12/26/17 15:00:47)   CO2: 28 mmol/L (12/26/17 14:24:40) WBC:?35.1 x10(3)/mcL?Critical (12/26/17 14:02:14)   Calcium Lvl: 8.6 mg/dL (12/26/17 14:24:40) RBC:?1.62 x10(6)/mcL?Low (12/26/17 14:02:14)   Glucose Lvl:?150 mg/dL?High (12/26/17 14:24:40) Hgb:?5.1 gm/dL?Critical (12/26/17 14:02:14)   BUN:?26 mg/dL?High (12/26/17 14:24:40) Hct:?16.4 %?Critical (12/26/17 14:02:14)   Creatinine: 1 mg/dL (12/26/17 14:24:40) Platelet: 341 x10(3)/mcL (12/26/17 14:02:14)   eGFR-AA:?69 mL/min?Low (12/26/17 14:24:41) MCV:?101.2 fL?High (12/26/17 14:02:14)   eGFR-LINDA:?57 mL/min?Low (12/26/17 14:24:44) MCH: 31.5 pg (12/26/17 14:02:14)   Bili Total: 0.4 mg/dL (12/26/17 14:24:40) MCHC: 31.1 gm/dL (12/26/17 14:02:14)   Bili Direct: 0.2 mg/dL (12/26/17 14:24:40) RDW:?19.2 %?High (12/26/17 14:02:14)   Bili Indirect: 0.2 mg/dL (12/26/17 14:24:40) MPV:?10.5 fL?High (12/26/17 14:02:14)   AST:?55 unit/L?High (12/26/17 14:24:40) Abs Neut:?27.84 x10(3)/mcL?High (12/26/17 14:02:14)   ALT: 13 unit/L (12/26/17 14:24:40) Segs Man:?85 %?High (12/26/17 14:23:43)   Alk Phos:?291 unit/L?High (12/26/17 14:24:40) Band Man: 2 % (12/26/17 14:23:43)   Total Protein:?5.9 gm/dL?Low (12/26/17 14:24:40) Lymph Man:?7 %?Low (12/26/17 14:23:43)   Albumin Lvl:?2.3 gm/dL?Low (12/26/17 14:24:40) Monocyte Man: 2 % (12/26/17 14:23:43)   Globulin:?3.6 gm/mL?High (12/26/17 14:24:40) Eos Man: 4 % (12/26/17 14:23:43)   A/G Ratio: 1 ratio (12/26/17 14:24:40) Basophil Man: 0 % (12/26/17 14:23:43)   NT pro BNP.:?1447 pg/mL?High (12/26/17 14:24:45) NRBC Man: 1 % (12/26/17 14:23:43)   Iron Lvl:?25 mcg/dL?Low (12/26/17 15:01:51) Hypochrom: 1+ (12/26/17 14:23:43)   Transferrin:?195 mg/dL?Low (12/26/17 15:01:51) Platelet Est: Clumped, normal (12/26/17 14:23:43)   TIBC:?249 mcg/dL?Low (12/26/17 15:01:51) Anisocyte: 1+ (12/26/17 14:23:43)   Iron Sat:?10 %?Low (12/26/17 15:01:51) Poik: 1+ (12/26/17 14:23:43)   Ferritin Lvl: 67.3 ng/mL (12/26/17 15:01:50) Microcyte: 1+  (12/26/17 14:23:43)   Total CK:?25 unit/L?Low (12/26/17 14:24:42) Macrocyte: 1+ (12/26/17 14:23:43)   CK MB:?<1.0?Low (12/26/17 14:24:43) Polychrom: 2+ (12/26/17 14:23:43)   POC Troponin: 0 ng/mL (12/26/17 15:00:33) RBC Morph: Abnormal (12/26/17 14:23:43)    Schistocyte: 1+ (12/26/17 14:23:43)    Spherocyte: 1+ (12/26/17 14:23:43)   ?  ?  ?  ?      Pt was admitted overnight.? I saw the patient with the residents today?and discussed the case, assisted with the development of the assessment and agree with the plan of care.? Pt was admitted for symptomatic anemia and found to have postmenopausal uterine bleeding as the source w/ malignancy evaluated via biopsy.? Has HTN and CHF w/ medical mgmt continued.? Also found elevated WBC for which pt was asymptomatic but UA was dirty.? Planned to pull ED [place] and sort ab although question if leukocytosis was related to malignancy and anemia.? As pt is stable, if able to be d/c, will regroup on as outpt.

## 2022-05-02 NOTE — HISTORICAL [facility] CERNER
This is a historical note converted from [employer]. Formatting and pictures may have been removed.  Please reference [employer]lennie for original formatting and attached multimedia. Chief Complaint  Pt arrived via [facility] c/o weakness and numbess to BLE yesterday that has spread to BUE today, Reports fall w/ LOC yesterday. Hx uterine CA, RA, CHF  Feeling weak  History of Present Illness  Patient is a 81-year-old morbidly obese  female with a past medical history of endometrioid carcinoma diagnosed in 2017 comes to [facility] ED because of weakness and being numb all over her body.? According to the patient she drank a lot of fluids yesterday.? While on her way to the bathroom she felt weak, slept and fell.? Denies of hitting her head.? She called EMS yesterday.? EMS lifted her up from the ground to the chair.? She felt better and did not come to the hospital yesterday.? When she woke up in the morning she felt weak and wanted to visit hospital.? Upon asking in detail patient says that she has been bleeding vaginally?on and off since years.? She uses diapers for vaginal bleed.? She was diagnosed of endometrioid carcinoma in 2017 but did not want to get any surgery regarding the same.??She also complains of?straining in stools?for constipation.??Denies of blood in stool.? I called the daughter for further updates.? Daughter reports that her mother has severe anxiety issues.? Yesterday her cane slipped on a plastic bag because she lost balance and fell.? She also expressed her mother never wants to be on life support.? She confirmed that her mother is DNR/DNI.? She also has put her mother on strict no sodium diet since long time.? She gives her over-the-counter fluid pills which usually works for her.? She does not have any PCP.? Per chart review-on 12/27/2017 GYN notes says that they found fungating mass filling the vaginal vault with abnormal cervical structure, findings suspicious for cervical cancer.? Biopsy reveals  endometrioid carcinoma.? Patients daughter and patient did not want any intervention as she is old at that point of time. ? Patient denies of any shortness of breath( not any more), chest pain, palpitations, dizziness, abdominal pain, nausea, vomiting,?hemoptysis,?melena.?  ?  ED course  Patients blood pressure was labile 91/42, map of 58 on arrival.? She is stating 100% on room air, respiratory rate 22, heart rate 88.? As of now her blood pressure is 108/57, map of 74 and she is currently stable.? Labs demonstrated as follows  Hyponatremia-127, hyperkalemia 5.6, hypomagnesemia 1.6, BNP elevated 174 (baseline BMP in last admission was 1447), A1C- 5.7, no elevation in troponin, TSH/T4 within normal limit, no leukocytosis (9.6), low H&H 6.3/ 23.7, low MCV, Covid negative.? FOBT?negative in ED. CT head?was negative?for any intracranial bleed.?Ultrasound pelvis demonstrates markedly enlarged uterus with overall poor characterization likely secondary to artifact that may be related to possible diffuse malignant process involving the uterus given to the patients history.  ?  X-ray spine-multilevel degenerative changes of thoracic spine  X-ray cervical spine-moderate degenerative changes of cervical spine  Chest x-ray demonstrates bilateral hilar prominence.  CT abdomen results pending  Review of Systems  Constitutional: Fatigue+ , Denies Fever  Respiratory:?no trouble breathing or shortness of breath  Cardiovascular:?no chest pain or tightness,?no shortness breath on activity,?no ankle swelling  Gastrointestinal:no constipation,?no diarrhea,?no nausea,?no vomiting  Musculoskeletal:?No muscle pain,?no joint pain  Integumentary: no rashes or breakdown  Neurologic: no dizziness, no fainting  Physical Exam  Vitals & Measurements  T:?36.6? ?C (Tympanic)? TMIN:?36.2? ?C (Tympanic)? TMAX:?36.6? ?C (Tympanic)? HR:?91(Peripheral)? RR:?20? BP:?91/55? SpO2:?99%? WT:?119.8?kg?  General:?Elderly  female morbidly obese  lying?on the bed?covered with several blankets.  Respiratory:?CTA bilaterally  Cardiovascular:?regular rate and rhythm without murmurs. B/l loer ext edema pitting +1.  Gastrointestinal:?soft, non-tender, non-distended with normal bowel sounds,?cannot feel for masses because of morbid obesity  Musculoskeletal:??Normal strength and tone  Integumentary: No sacral?wound.? Mild erythema?in the back cracks. b/l lower ext are big at base line but also has 1 + pitting edema  Neurologic: Alert and oriented x3.? Pupils?reactive?bilaterally. ?Cranial nerves?grossly intact.? Heel-to-shin test?intact.? Finger-nose test intact.? Power?in lower extremities?intact.? Sensations?bilaterally?lower extremities intact. No focal neurological defecit.  Psychological: Anxious  -vaginal exam- Pure wick catheter removed. It had a big clot measuring .5x .5 cm. Gush of?juancarlos blood coming out of vaginal.??Anal sphincter tone ?intact. ?No hemorrhoids seen.? FOBT negative.??Stool in the rectal?exam. ?Mild erythema?noted?in butt cheeks.  Assessment/Plan  Assessment/plan  Symptomatic anemia?secondary to cervical mass  History of endometrioid cervical carcinoma  Severe iron deficiency anemia  Hyponatremia  Hypomagnesemia  Hyperkalemia with no EKG changes  Hypothyroidism  Numbness resolved  ?  ?   We will admit the patient to?telemetry  Patient is currently getting 2 units of blood. ?We will check an H&H?1 hour after?blood transfusion.? We will transfuse?if hemoglobin less than seven  OB/GYN?consulted.? Appreciate their assistance.  CT abd and Pelvis results pending.  Iron studies?results pending  Will give?500?bolus of normal saline.? Restrict fluid?to less than 1 L?a day.? Lab work results pending?for?hyponatremia  2 g of mag sulfate given to the patient  Transthoracic echo?results pending  Continue home dose of levothyroxine  DVT prophylaxis- SCDs  ?   Code status- DNR/ DNI  ?   Deana Flores?  [facility] 1 [employer] [facility]?  Faculty addendum:? Case discussed  with Dr. Flores 6/17/21.? Recommendations provided for workup of electrolyte abnormalities.? Patient undergoing transfusion.? DNR status discussed.? Patient is DNR/DNI and is interested in hospice services.? Also discussed with Dr. Sullivan.? She is in agreement with hospice and also states radiation therapy may be beneficial.   Problem List/Past Medical History  Ongoing  Anxiety  CHF - Congestive heart failure  Chronic insomnia  Hypertension  Noncompliance with treatment  Uterine cancer  Historical  No qualifying data  Procedure/Surgical History  Breast lumpectomy   Medications  Inpatient  carvedilol 12.5 mg oral tablet, 12.5 mg= 1 tab(s), Oral, BID  levothyroxine 75 mcg (0.075 mg) oral tablet, 75 mcg= 1 tab(s), Oral, Daily  Normal Saline (0.9% NS) IV, 500 mL, IV, Once  Home  CARVEDILOL 12.5 MG TABLET, 12.5 mg= 1 tab(s), Oral, BID  CLONAZEPAM 0.5 MG TABLET  FUROSEMIDE 20 MG TABLET, 20 mg= 1 tab(s), Oral, Daily  ISOSORBIDE MN ER 30 MG TABLET, 30 mg= 1 tab(s), Oral, Daily  levothyroxine 75 mcg (0.075 mg) oral tablet, 75 mcg= 1 tab(s), Oral, Daily  Rolling walker, See Instructions,? ?Not taking  Allergies  No Known Allergies  Social History  Abuse/Neglect  No, No, Yes, 06/17/2021  Alcohol  Current, Liquor, 3-5 times per week, 12/26/2017  Current, 1-2 times per month, 12/26/2017  Substance Use  Never, 12/26/2017  Tobacco  Never (less than 100 in lifetime), No, 06/17/2021  Never smoker, 12/26/2017  Family History  Family history is unknown  Lab Results  Labs Last 24 Hours?  ?Chemistry? Hematology/Coagulation?   Sodium Lvl:?127 mmol/L?Low (06/17/21 10:44:00) PT:?15.1 second(s)?High (06/17/21 10:44:00)   Potassium Lvl:?5.6 mmol/L?High (06/17/21 10:44:00) INR: 1.2 (06/17/21 10:44:00)   Chloride:?90 mmol/L?Low (06/17/21 10:44:00) PTT: 34.9 second(s) (06/17/21 10:44:00)   CO2: 29 mmol/L (06/17/21 10:44:00) WBC: 9.6 x10(3)/mcL (06/17/21 10:44:00)   Calcium Lvl: 9.1 mg/dL (06/17/21 10:44:00) RBC:?3.59 x10(6)/mcL?Low  (21 10:44:00)   Magnesium Lvl: 1.6 mg/dL (21 10:44:00) Hgb:?6.3 gm/dL?Critical (21 10:44:00)   Glucose Lvl:?121 mg/dL?High (21 10:44:00) Hct:?23.7 %?Low (21 10:44:00)   EA.7 mg/dL (21 10:44:00) Platelet: 252 x10(3)/mcL (21 10:44:00)   BUN: 14.3 mg/dL (21 10:44:00) MCV:?66 fL?Low (21 10:44:00)   Creatinine: 0.82 mg/dL (21 10:44:00) MCH:?17.5 pg?Low (21 10:44:00)   Est Creat Clearance Ser: 45.25 mL/min (21 11:10:07) MCHC:?26.6 gm/dL?Low (21 10:44:00)   eGFR-AA:?86?Low (21 10:44:00) RDW:?19.2 %?High (21 10:44:00)   eGFR-LINDA:?71 mL/min/1.73 m2?Low (21 10:44:00) MPV: 8.9 fL (21 10:44:00)   Bili Total: 0.8 mg/dL (21 10:44:00) Abs Neut: 6.83 x10(3)/mcL (21 10:44:00)   Bili Direct: 0.5 mg/dL (21 10:44:00) Neutro Auto: 72 % (21 10:44:00)   Bili Indirect: 0.3 mg/dL (21 10:44:00) Lymph Auto: 12 % (21 10:44:00)   AST:?43 unit/L?High (21 10:44:00) Mono Auto: 7 % (21 10:44:00)   ALT: 20 unit/L (21 10:44:00) Eos Auto: 8 % (21 10:44:00)   Alk Phos:?302 unit/L?High (21 10:44:00) Abs Eos: 0.8 x10(3)/mcL (21 10:44:00)   Total Protein: 6.2 gm/dL (21 10:44:00) Basophil Auto: 1 % (21 10:44:00)   Albumin Lvl:?3 gm/dL?Low (21 10:44:00) Abs Neutro: 6.83 x10(3)/mcL (21 10:44:00)   Globulin: 3.2 gm/dL (21 10:44:00) Abs Lymph: 1.1 x10(3)/mcL (21 10:44:00)   A/G Ratio:?0.9 ratio?Low (21 10:44:00) Abs Mono: 0.7 x10(3)/mcL (21 10:44:00)   BNP:?174.7 pg/mL?High (21 10:44:00) Abs Baso: 0.1 x10(3)/mcL (21 10:44:00)   Hgb A1c: 5.1 % (21 10:44:00) NRBC%: 0.2 (21 10:44:00)   Troponin-I: <0.010 (21 10:44:00) IG%: 0 % (21 10:44:00)   T4 Free: 1.34 ng/dL (21 10:44:00) IG#: 0.05 (21 10:44:00)   TSH: 1.1083 uIU/mL (21 10:44:00) Hypochrom: 2+ (21 10:44:00)    Platelet Est:  Adequate (06/17/21 10:44:00)    Anisocyte: 2+ (06/17/21 10:44:00)    Poik: 1+ (06/17/21 10:44:00)    Microcyte: 1+ (06/17/21 10:44:00)    Polychrom: 1+ (06/17/21 10:44:00)    RBC Morph: Abnormal (06/17/21 10:44:00)    Schistocyte: 1+ (06/17/21 10:44:00)    Target Cell: 1+ (06/17/21 10:44:00)    Stomatocytes: 1+ (06/17/21 10:44:00)    Ovalocytes: 1+ (06/17/21 10:44:00)   Diagnostic Results  Accession:?[id number]  Order:?US Pelvic Non-OB w Transvag if needed  Report Dt/Tm:?06/17/2021 14:25  Report:?  TECHNIQUE: Transabdominal images were obtained. Transvaginal images  were deferred because of difficulty positioning the patient.  ?  HISTORY: Untreated cervical cancer, vaginal bleeding  ?  COMPARISON: CT abdomen 12/26/2017?  ?  FINDINGS: The uterus measures 18.6 x 9.4 x 9.7 cm. The internal  architecture of the uterus is heterogeneous with poor visualization.  This poor visualization is likely secondary to both the lack of  transvaginal images as well as artifact that may be related to a  possible diffuse malignant process involving the uterus given the  patients history.  ?  Neither ovary was definitively seen. This is most likely secondary to  their small size, bowel gas artifact, and the lack of transvaginal  images.  ?  There is no free fluid in the visualized pelvis.  ?  ?  IMPRESSION:?  ?  1. Markedly enlarged uterus with overall poor characterization as  above.  ?  2. Neither ovary seen as above.  ?  Accession:?[id number]  Order:?XR Chest 2 Views  Report Dt/Tm:?06/17/2021 11:44  Report:?  ?  History:  Adenopathy  ?  Reference:  No priors  ?  Findings:  PA and lateral views of the chest were obtained. Heart is enlarged and  there is aortic atherosclerosis. There are some bilateral hilar  prominence. The lungs are grossly clear. No pneumothorax or  significant effusion.  ?  Impression:?  Bilateral hilar prominence.  ?  ?  ?  ?  ?  Accession:?[id number]  Order:?XR Spine Lumbar 2 or 3 Views  Report  Dt/Tm:?06/17/2021 11:41  Report:?  EXAMINATION  XR Spine Lumbar 2 or 3 Views  ?  INDICATION  Numbness  ?  Comparison: None available  ?  FINDINGS  There are 5 nonrib-bearing lumbar-type vertebral bodies with an  additional transitional type lumbosacral vertebral body labeled as S1.  Alignment is preserved without subluxation. The vertebral body heights  are maintained. There is moderate disc height loss at L5-S1, . Levels  with small multilevel marginal osteophytes. Mild facet arthropathy is  present in a lower lumbar spine. Vascular calcifications are noted.  ?  IMPRESSION  1. ?No acute abnormality identified.  2. ?Mild degenerative changes of the lumbar spine.  ?  Accession:?[id number]  Order:?XR Spine Thoracic 3 Views  Report Dt/Tm:?06/17/2021 11:41  Report:?  XR Spine Thoracic 3 Views  ?  HISTORY: Numbness  ?  COMPARISON: None.  ?  FINDINGS:  There is a mild rightward curvature of the thoracic spine. The  visualized vertebral body heights are maintained. Multilevel disc  height loss and marginal osteophyte formation. Vascular calcifications  are noted. The soft tissues are otherwise unremarkable.  ?  ?  IMPRESSION:  1. ?No acute abnormality identified.  2. ?Multilevel degenerative changes of the thoracic spine.  ?  ?  Accession:?[id number]  Order:?XR Spine Cervical 2 or 3 Views  Report Dt/Tm:?06/17/2021 11:38  Report:?  XR Spine Cervical 2 or 3 Views  ?  HISTORY: Numbness  ?  COMPARISON: None.  ?  FINDINGS:  There is reversal normal cervical lordosis with minimal retrolisthesis  of C3 over C4. The visualized body heights are maintained. There is  moderate disc height loss from C3 through C7 with marginal osteophyte  formation. There is bilateral facet arthropathy. The soft tissues are  unremarkable.  ?  ?  IMPRESSION:  1. ?No acute abnormality identified.  2. ?Moderate degenerative changes of the cervical spine.  ?  ?  Accession:?[id number]  Order:?CT Head W/O Contrast  Report Dt/Tm:?06/17/2021  11:27  Report:?  ?  Clinical History:  Head injury  ?  Reference:  None Available.  ?  Technique:  CT imaging of the head performed from the skull base to the vertex  without intravenous contrast. DLP 1196 mGycm. Automatic exposure  control, adjustment of mA/kV or iterative reconstruction technique was  used to reduce radiation.  ?  Findings:  There is no acute cortical infarct, hemorrhage or mass lesion. There  is mild patchy hypoattenuation in the cerebral white matter which is  nonspecific but most commonly associated with chronic small vessel  ischemic changes. The ventricles are not significantly enlarged. There  are vascular calcifications.  ?  Visualized paranasal sinuses and mastoid air cells are clear.  ?  Impression:  No acute intracranial findings.  ?  ?

## 2022-05-02 NOTE — HISTORICAL [facility] CERNER
This is a historical note converted from [employer]. Formatting and pictures may have been removed.  Please reference [employer] for original formatting and attached multimedia. Chief Complaint  Pt arrived via [facility] c/o weakness and numbess to BLE yesterday that has spread to BUE today, Reports fall w/ LOC yesterday. Hx uterine CA, RA, CHF  Reason for Consultation  Postmenopausal bleeding  History of Present Illness  81 female with a history of endometrial adenocarcinoma who initially presented to the ED with weakness, found to to be anemic 2/2 heavy vaginal bleeding. Patient is a poor historian and is unable to say when her bleeding started or how much she has been bleeding at home. Unsure about the details regarding her cancer diagnosis. States she was diagnosed several years ago but did not want treatment. She is able to answer questions appropriately. She lives with her daughter, says that she makes her own medical decisions.  Review of Systems  Constitutional: denies weight changes; denies fever; denies nausea/vomiting, admits to chills  Respiratory: denies difficulty breathing  Cardiovascular: denies chest pain  Gastrointestinal: denies?abdominal pain,?denies changes in bowel habits  Genitourinary: vaginal bleeding; denies vaginal discharge  Neurologic: denies HA, syncope, weakness  All Other ROS: negative?except HPI  Physical Exam  Vitals & Measurements  T:?36.6? ?C (Oral)? TMIN:?36.2? ?C (Tympanic)? TMAX:?36.6? ?C (Tympanic)? HR:?81(Monitored)? RR:?18? BP:?120/67? SpO2:?97%? WT:?119.8?kg? BMI:?46.8?  General Appearance: Alert, cooperative, no distress  Lungs: Respirations unlabored  Heart: Regular rate  Abdomen: Soft, non-distended, non-tender  Pelvic: normal appearing external genitalia, large, fungating mass palpable in the vagina, unable to feel normal cervix. Minimal blood noted on pad and on glove after exam.  Rectal: 4-5cm vaginal?mass palpable through the rectum but not invading into the rectum  Extremities:  Extremities normal, atraumatic, symmetrical swelling in bilateral lower extremities, no erythema or calf tenderness  Skin: Skin turgor normal, no rashes or lesions.  Patient examined with Dr. Sullivan  Assessment/Plan  1. Endometrial adenocarcinoma with fungating mass:  -- diagnosed in 2017, per chart review patient did not desire treatment, on CT A/P today uterus is approx 20cm  -- offered outpatient consultation with radiation oncology in order to provide an emergent treatment to help stop the bleeding, patient and her daughter are open to the idea  -- discussed involving hospice at this time to help with pain control and assistance with determining end of life goals, they would like to meet with them  -- patient is DNR  ?  2. Acute blood loss anemia  -- in the process of receiving her first unit of pRBCs, plan for an additional unit  -- if she continues to have heavy vaginal bleeding can apply Monsels to the mass and pack the vagina  -- pad counts overnight  ?  Management of?co-morbidities per primary team.  ?  Marlys Traore MD  [employer] PGY-4  ?   Reviewed the patients medical history, residents findings on physical exam, and the diagnosis with treatment plan. I saw patient on rounds. Care provided was reasonable and necessary.  At this point in her cancer course, any treatment is palliative.? She would benefit from a [facility] consultation to discuss future treatment if her bleeding worsens with disease progression.? I also recommended to the patient and her daughter to speak with a hospice group.? If her bleeding resolves overnight, she should be able to be discharged in the morning with outpatient followup with referrals.? She can be seen by GYN prn.   Problem List/Past Medical History  Ongoing  Anxiety  CHF - Congestive heart failure  Chronic insomnia  Hypertension  Morbid obesity  Noncompliance with treatment  Uterine cancer  Historical  No qualifying data  Procedure/Surgical History  Breast lumpectomy    Medications  Inpatient  carvedilol 12.5 mg oral tablet, 12.5 mg= 1 tab(s), Oral, BID  levothyroxine 75 mcg (0.075 mg) oral tablet, 75 mcg= 1 tab(s), Oral, Daily  magnesium sulfate (for CRI), 2 gm= 50 mL, IV Piggyback, Once  Normal Saline (0.9% NS) IV, 500 mL, IV, Once  Home  CARVEDILOL 12.5 MG TABLET, 12.5 mg= 1 tab(s), Oral, BID  CLONAZEPAM 0.5 MG TABLET  FUROSEMIDE 20 MG TABLET, 20 mg= 1 tab(s), Oral, Daily  ISOSORBIDE MN ER 30 MG TABLET, 30 mg= 1 tab(s), Oral, Daily  levothyroxine 75 mcg (0.075 mg) oral tablet, 75 mcg= 1 tab(s), Oral, Daily  Allergies  No Known Allergies  Social History  Abuse/Neglect  No, No, Yes, 06/17/2021  Alcohol  Current, Liquor, 3-5 times per week, 12/26/2017  Current, 1-2 times per month, 12/26/2017  Substance Use  Never, 12/26/2017  Tobacco  Never (less than 100 in lifetime), No, 06/17/2021  Never smoker, 12/26/2017  Family History  Family history is unknown  Lab Results  Test Name Test Result Date/Time Comments   Sodium Lvl 127 mmol/L (Low) 06/17/2021 10:44 CDT    Potassium Lvl 5.6 mmol/L (High) 06/17/2021 10:44 CDT    Creatinine 0.82 mg/dL 06/17/2021 10:44 CDT    AST 43 unit/L (High) 06/17/2021 10:44 CDT    ALT 20 unit/L 06/17/2021 10:44 CDT    Hgb A1c 5.1 % 06/17/2021 10:44 CDT    TSH 1.1083 uIU/mL 06/17/2021 10:44 CDT    WBC 9.6 x10(3)/mcL 06/17/2021 10:44 CDT    Hgb 6.3 gm/dL (Critical) 06/17/2021 10:44 CDT Critical result called to Ila German on 6/17/2021 10:58:24 CDT _ and verified by verbal readback. MPG   Hct 23.7 % (Low) 06/17/2021 10:44 CDT    Diagnostic Results  (06/17/2021 15:56 CDT CT Abdomen and Pelvis W W/O Contrast)  ?  IMPRESSION:?  Comparison December 2017  ?  Left hepatic presumed vascular lesion reidentified. Smaller  circumscribed hepatic hypodensities similar prior exam. Spleen stable  size. 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 Adrenal glands and pancreas without adverse  change  ?  Kidneys without  hydronephrosis. Small circumscribed right renal  hypodensities are obscured by motion artifact, appears slightly larger  indeterminate. no overt urographic filling defect. Urinary bladder  partially distended. 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  ?  The uterus is markedly enlarged perhaps slightly increasing in size up  to 20 cm length.  ?  No overtly enlarged lymph nodes.  ?  No obstructive bowel findings. Fat-containing left spigelian hernia  reidentified.     [1]?CT Abdomen and Pelvis W W/O Contrast; Sowmya MCCLENDON, Alda Patterson 06/17/2021 15:56 CDT

## 2022-06-07 ENCOUNTER — LAB REQUISITION (OUTPATIENT)
Dept: LAB | Facility: HOSPITAL | Age: 82
End: 2022-06-07
Attending: FAMILY MEDICINE
Payer: MEDICARE

## 2022-06-07 DIAGNOSIS — Z29.9 ENCOUNTER FOR PROPHYLACTIC MEASURES, UNSPECIFIED: ICD-10-CM

## 2022-06-07 LAB
ALBUMIN SERPL-MCNC: 2.8 GM/DL (ref 3.4–4.8)
ALBUMIN/GLOB SERPL: 1 RATIO (ref 1.1–2)
ALP SERPL-CCNC: 204 UNIT/L (ref 40–150)
ALT SERPL-CCNC: 6 UNIT/L (ref 0–55)
ANISOCYTOSIS BLD QL SMEAR: SLIGHT
AST SERPL-CCNC: 20 UNIT/L (ref 5–34)
BASOPHILS # BLD AUTO: 0.05 X10(3)/MCL (ref 0–0.2)
BASOPHILS NFR BLD AUTO: 0.7 %
BILIRUBIN DIRECT+TOT PNL SERPL-MCNC: 0.3 MG/DL
BUN SERPL-MCNC: 13.6 MG/DL (ref 9.8–20.1)
CALCIUM SERPL-MCNC: 8.7 MG/DL (ref 8.4–10.2)
CHLORIDE SERPL-SCNC: 102 MMOL/L (ref 98–107)
CO2 SERPL-SCNC: 27 MMOL/L (ref 23–31)
CREAT SERPL-MCNC: 0.64 MG/DL (ref 0.55–1.02)
EOSINOPHIL # BLD AUTO: 0.63 X10(3)/MCL (ref 0–0.9)
EOSINOPHIL NFR BLD AUTO: 9.4 %
ERYTHROCYTE [DISTWIDTH] IN BLOOD BY AUTOMATED COUNT: 14.8 % (ref 11.5–17)
GLOBULIN SER-MCNC: 2.7 GM/DL (ref 2.4–3.5)
GLUCOSE SERPL-MCNC: 109 MG/DL (ref 82–115)
HCT VFR BLD AUTO: 24.1 % (ref 37–47)
HGB BLD-MCNC: 7.1 GM/DL (ref 12–16)
HYPOCHROMIA BLD QL SMEAR: ABNORMAL
IMM GRANULOCYTES # BLD AUTO: 0.02 X10(3)/MCL (ref 0–0.02)
IMM GRANULOCYTES NFR BLD AUTO: 0.3 % (ref 0–0.43)
LYMPHOCYTES # BLD AUTO: 1.05 X10(3)/MCL (ref 0.6–4.6)
LYMPHOCYTES NFR BLD AUTO: 15.6 %
MCH RBC QN AUTO: 25 PG (ref 27–31)
MCHC RBC AUTO-ENTMCNC: 29.5 MG/DL (ref 33–36)
MCV RBC AUTO: 84.9 FL (ref 80–94)
MONOCYTES # BLD AUTO: 0.58 X10(3)/MCL (ref 0.1–1.3)
MONOCYTES NFR BLD AUTO: 8.6 %
NEUTROPHILS # BLD AUTO: 4.4 X10(3)/MCL (ref 2.1–9.2)
NEUTROPHILS NFR BLD AUTO: 65.4 %
NRBC BLD AUTO-RTO: 0.7 %
OVALOCYTES (OLG): SLIGHT
PLATELET # BLD AUTO: 254 X10(3)/MCL (ref 130–400)
PLATELET # BLD EST: ADEQUATE 10*3/UL
PLATELETS.RETICULATED NFR BLD AUTO: 2.6 % (ref 0.9–11.2)
PMV BLD AUTO: 10.4 FL (ref 9.4–12.4)
POIKILOCYTOSIS BLD QL SMEAR: SLIGHT
POLYCHROMASIA BLD QL SMEAR: SLIGHT
POTASSIUM SERPL-SCNC: 4.4 MMOL/L (ref 3.5–5.1)
PROT SERPL-MCNC: 5.5 GM/DL (ref 5.8–7.6)
RBC # BLD AUTO: 2.84 X10(6)/MCL (ref 4.2–5.4)
SODIUM SERPL-SCNC: 142 MMOL/L (ref 136–145)
TARGETS BLD QL SMEAR: SLIGHT
TEAR DROP CELL (OLG): SLIGHT
WBC # SPEC AUTO: 6.7 X10(3)/MCL (ref 4.5–11.5)

## 2022-06-07 PROCEDURE — 80053 COMPREHEN METABOLIC PANEL: CPT | Performed by: FAMILY MEDICINE

## 2022-06-07 PROCEDURE — 85025 COMPLETE CBC W/AUTO DIFF WBC: CPT | Performed by: FAMILY MEDICINE

## 2022-06-08 ENCOUNTER — LAB REQUISITION (OUTPATIENT)
Dept: LAB | Facility: HOSPITAL | Age: 82
End: 2022-06-08
Payer: MEDICARE

## 2022-06-08 DIAGNOSIS — Z29.9 ENCOUNTER FOR PROPHYLACTIC MEASURES, UNSPECIFIED: ICD-10-CM

## 2022-06-08 LAB
ANISOCYTOSIS BLD QL SMEAR: ABNORMAL
BASOPHILS # BLD AUTO: 0.06 X10(3)/MCL (ref 0–0.2)
BASOPHILS NFR BLD AUTO: 0.8 %
BNP BLD-MCNC: 453.3 PG/ML
EOSINOPHIL # BLD AUTO: 0.64 X10(3)/MCL (ref 0–0.9)
EOSINOPHIL NFR BLD AUTO: 8.8 %
ERYTHROCYTE [DISTWIDTH] IN BLOOD BY AUTOMATED COUNT: 14.9 % (ref 11.5–17)
HCT VFR BLD AUTO: 23.4 % (ref 37–47)
HGB BLD-MCNC: 6.9 GM/DL (ref 12–16)
HYPOCHROMIA BLD QL SMEAR: ABNORMAL
IMM GRANULOCYTES # BLD AUTO: 0.03 X10(3)/MCL (ref 0–0.02)
IMM GRANULOCYTES NFR BLD AUTO: 0.4 % (ref 0–0.43)
LYMPHOCYTES # BLD AUTO: 0.97 X10(3)/MCL (ref 0.6–4.6)
LYMPHOCYTES NFR BLD AUTO: 13.3 %
MCH RBC QN AUTO: 25.9 PG (ref 27–31)
MCHC RBC AUTO-ENTMCNC: 29.5 MG/DL (ref 33–36)
MCV RBC AUTO: 88 FL (ref 80–94)
MONOCYTES # BLD AUTO: 0.66 X10(3)/MCL (ref 0.1–1.3)
MONOCYTES NFR BLD AUTO: 9.1 %
NEUTROPHILS # BLD AUTO: 4.9 X10(3)/MCL (ref 2.1–9.2)
NEUTROPHILS NFR BLD AUTO: 67.6 %
NRBC BLD AUTO-RTO: 0 %
PLATELET # BLD AUTO: 264 X10(3)/MCL (ref 130–400)
PLATELET # BLD EST: ADEQUATE 10*3/UL
PMV BLD AUTO: 10.4 FL (ref 9.4–12.4)
RBC # BLD AUTO: 2.66 X10(6)/MCL (ref 4.2–5.4)
RBC MORPH BLD: ABNORMAL
WBC # SPEC AUTO: 7.3 X10(3)/MCL (ref 4.5–11.5)

## 2022-06-08 PROCEDURE — 83880 ASSAY OF NATRIURETIC PEPTIDE: CPT | Performed by: FAMILY MEDICINE

## 2022-06-08 PROCEDURE — 85025 COMPLETE CBC W/AUTO DIFF WBC: CPT | Performed by: FAMILY MEDICINE

## 2022-06-09 ENCOUNTER — LAB REQUISITION (OUTPATIENT)
Dept: LAB | Facility: HOSPITAL | Age: 82
End: 2022-06-09
Payer: MEDICARE

## 2022-06-09 DIAGNOSIS — Z29.9 ENCOUNTER FOR PROPHYLACTIC MEASURES, UNSPECIFIED: ICD-10-CM

## 2022-06-09 LAB
BASOPHILS # BLD AUTO: 0.04 X10(3)/MCL (ref 0–0.2)
BASOPHILS NFR BLD AUTO: 0.6 %
EOSINOPHIL # BLD AUTO: 0.61 X10(3)/MCL (ref 0–0.9)
EOSINOPHIL NFR BLD AUTO: 9.7 %
ERYTHROCYTE [DISTWIDTH] IN BLOOD BY AUTOMATED COUNT: 15.2 % (ref 11.5–17)
HCT VFR BLD AUTO: 23.5 % (ref 37–47)
HGB BLD-MCNC: 6.9 GM/DL (ref 12–16)
IMM GRANULOCYTES # BLD AUTO: 0.02 X10(3)/MCL (ref 0–0.02)
IMM GRANULOCYTES NFR BLD AUTO: 0.3 % (ref 0–0.43)
LYMPHOCYTES # BLD AUTO: 0.96 X10(3)/MCL (ref 0.6–4.6)
LYMPHOCYTES NFR BLD AUTO: 15.3 %
MCH RBC QN AUTO: 25.2 PG (ref 27–31)
MCHC RBC AUTO-ENTMCNC: 29.4 MG/DL (ref 33–36)
MCV RBC AUTO: 85.8 FL (ref 80–94)
MONOCYTES # BLD AUTO: 0.64 X10(3)/MCL (ref 0.1–1.3)
MONOCYTES NFR BLD AUTO: 10.2 %
NEUTROPHILS # BLD AUTO: 4 X10(3)/MCL (ref 2.1–9.2)
NEUTROPHILS NFR BLD AUTO: 63.9 %
NRBC BLD AUTO-RTO: 0 %
PLATELET # BLD AUTO: 255 X10(3)/MCL (ref 130–400)
PMV BLD AUTO: 10.5 FL (ref 9.4–12.4)
RBC # BLD AUTO: 2.74 X10(6)/MCL (ref 4.2–5.4)
WBC # SPEC AUTO: 6.3 X10(3)/MCL (ref 4.5–11.5)

## 2022-06-09 PROCEDURE — 85025 COMPLETE CBC W/AUTO DIFF WBC: CPT | Performed by: FAMILY MEDICINE

## 2022-06-15 ENCOUNTER — LAB REQUISITION (OUTPATIENT)
Dept: LAB | Facility: HOSPITAL | Age: 82
End: 2022-06-15
Payer: MEDICARE

## 2022-06-15 DIAGNOSIS — I48.91 UNSPECIFIED ATRIAL FIBRILLATION: ICD-10-CM

## 2022-06-15 LAB
BASOPHILS # BLD AUTO: 0.06 X10(3)/MCL (ref 0–0.2)
BASOPHILS NFR BLD AUTO: 0.8 %
EOSINOPHIL # BLD AUTO: 0.74 X10(3)/MCL (ref 0–0.9)
EOSINOPHIL NFR BLD AUTO: 9.7 %
ERYTHROCYTE [DISTWIDTH] IN BLOOD BY AUTOMATED COUNT: 15.4 % (ref 11.5–17)
HCT VFR BLD AUTO: 33.2 % (ref 37–47)
HGB BLD-MCNC: 10.1 GM/DL (ref 12–16)
IMM GRANULOCYTES # BLD AUTO: 0.01 X10(3)/MCL (ref 0–0.02)
IMM GRANULOCYTES NFR BLD AUTO: 0.1 % (ref 0–0.43)
LYMPHOCYTES # BLD AUTO: 1.06 X10(3)/MCL (ref 0.6–4.6)
LYMPHOCYTES NFR BLD AUTO: 13.9 %
MCH RBC QN AUTO: 25.1 PG (ref 27–31)
MCHC RBC AUTO-ENTMCNC: 30.4 MG/DL (ref 33–36)
MCV RBC AUTO: 82.6 FL (ref 80–94)
MONOCYTES # BLD AUTO: 0.7 X10(3)/MCL (ref 0.1–1.3)
MONOCYTES NFR BLD AUTO: 9.2 %
NEUTROPHILS # BLD AUTO: 5.1 X10(3)/MCL (ref 2.1–9.2)
NEUTROPHILS NFR BLD AUTO: 66.3 %
NRBC BLD AUTO-RTO: 0 %
PLATELET # BLD AUTO: 215 X10(3)/MCL (ref 130–400)
PLATELETS.RETICULATED NFR BLD AUTO: 2.6 % (ref 0.9–11.2)
PMV BLD AUTO: 10.5 FL (ref 9.4–12.4)
RBC # BLD AUTO: 4.02 X10(6)/MCL (ref 4.2–5.4)
WBC # SPEC AUTO: 7.6 X10(3)/MCL (ref 4.5–11.5)

## 2022-06-15 PROCEDURE — 85025 COMPLETE CBC W/AUTO DIFF WBC: CPT | Performed by: FAMILY MEDICINE

## 2022-06-28 ENCOUNTER — LAB REQUISITION (OUTPATIENT)
Dept: LAB | Facility: HOSPITAL | Age: 82
End: 2022-06-28
Payer: MEDICARE

## 2022-06-28 DIAGNOSIS — D64.9 ANEMIA, UNSPECIFIED: ICD-10-CM

## 2022-06-28 LAB
BASOPHILS # BLD AUTO: 0.08 X10(3)/MCL (ref 0–0.2)
BASOPHILS NFR BLD AUTO: 0.9 %
EOSINOPHIL # BLD AUTO: 0.55 X10(3)/MCL (ref 0–0.9)
EOSINOPHIL NFR BLD AUTO: 6.4 %
ERYTHROCYTE [DISTWIDTH] IN BLOOD BY AUTOMATED COUNT: 16.2 % (ref 11.5–17)
HCT VFR BLD AUTO: 33.7 % (ref 37–47)
HGB BLD-MCNC: 10.3 GM/DL (ref 12–16)
IMM GRANULOCYTES # BLD AUTO: 0.02 X10(3)/MCL (ref 0–0.02)
IMM GRANULOCYTES NFR BLD AUTO: 0.2 % (ref 0–0.43)
LYMPHOCYTES # BLD AUTO: 1.01 X10(3)/MCL (ref 0.6–4.6)
LYMPHOCYTES NFR BLD AUTO: 11.8 %
MCH RBC QN AUTO: 26.1 PG (ref 27–31)
MCHC RBC AUTO-ENTMCNC: 30.6 MG/DL (ref 33–36)
MCV RBC AUTO: 85.5 FL (ref 80–94)
MONOCYTES # BLD AUTO: 0.77 X10(3)/MCL (ref 0.1–1.3)
MONOCYTES NFR BLD AUTO: 9 %
NEUTROPHILS # BLD AUTO: 6.1 X10(3)/MCL (ref 2.1–9.2)
NEUTROPHILS NFR BLD AUTO: 71.7 %
NRBC BLD AUTO-RTO: 0 %
PLATELET # BLD AUTO: 253 X10(3)/MCL (ref 130–400)
PMV BLD AUTO: 10.4 FL (ref 9.4–12.4)
RBC # BLD AUTO: 3.94 X10(6)/MCL (ref 4.2–5.4)
WBC # SPEC AUTO: 8.6 X10(3)/MCL (ref 4.5–11.5)

## 2022-06-28 PROCEDURE — 85025 COMPLETE CBC W/AUTO DIFF WBC: CPT | Performed by: FAMILY MEDICINE

## 2022-07-06 ENCOUNTER — LAB REQUISITION (OUTPATIENT)
Dept: LAB | Facility: HOSPITAL | Age: 82
End: 2022-07-06
Payer: MEDICARE

## 2022-07-06 DIAGNOSIS — D64.9 ANEMIA, UNSPECIFIED: ICD-10-CM

## 2022-07-06 LAB
ABS NEUT CALC (OHS): 3.89 X10(3)/MCL (ref 2.1–9.2)
ANISOCYTOSIS BLD QL SMEAR: ABNORMAL
EOSINOPHIL NFR BLD MANUAL: 0.94 X10(3)/MCL (ref 0–0.9)
EOSINOPHIL NFR BLD MANUAL: 14 % (ref 0–8)
ERYTHROCYTE [DISTWIDTH] IN BLOOD BY AUTOMATED COUNT: 16.7 % (ref 11.5–17)
HCT VFR BLD AUTO: 32 % (ref 37–47)
HGB BLD-MCNC: 9.9 GM/DL (ref 12–16)
HYPOCHROMIA BLD QL SMEAR: ABNORMAL
IMM GRANULOCYTES # BLD AUTO: 0.01 X10(3)/MCL (ref 0–0.04)
IMM GRANULOCYTES NFR BLD AUTO: 0.1 %
LYMPH ABN # BLD MANUAL: 1 %
LYMPHOCYTES NFR BLD MANUAL: 1.14 X10(3)/MCL
LYMPHOCYTES NFR BLD MANUAL: 17 % (ref 13–40)
MCH RBC QN AUTO: 26.3 PG (ref 27–31)
MCHC RBC AUTO-ENTMCNC: 30.9 MG/DL (ref 33–36)
MCV RBC AUTO: 84.9 FL (ref 80–94)
MONOCYTES NFR BLD MANUAL: 0.67 X10(3)/MCL (ref 0.1–1.3)
MONOCYTES NFR BLD MANUAL: 10 % (ref 2–11)
NEUTROPHILS NFR BLD MANUAL: 58 % (ref 47–80)
NRBC BLD AUTO-RTO: 0 %
PLATELET # BLD AUTO: 215 X10(3)/MCL (ref 130–400)
PLATELET # BLD EST: ADEQUATE 10*3/UL
PMV BLD AUTO: 10.1 FL (ref 7.4–10.4)
RBC # BLD AUTO: 3.77 X10(6)/MCL (ref 4.2–5.4)
RBC MORPH BLD: ABNORMAL
WBC # SPEC AUTO: 6.7 X10(3)/MCL (ref 4.5–11.5)

## 2022-07-06 PROCEDURE — 85025 COMPLETE CBC W/AUTO DIFF WBC: CPT | Performed by: FAMILY MEDICINE

## 2022-07-21 PROCEDURE — 87077 CULTURE AEROBIC IDENTIFY: CPT | Performed by: FAMILY MEDICINE

## 2022-07-21 PROCEDURE — 81001 URINALYSIS AUTO W/SCOPE: CPT | Performed by: FAMILY MEDICINE

## 2022-07-22 ENCOUNTER — LAB REQUISITION (OUTPATIENT)
Dept: LAB | Facility: HOSPITAL | Age: 82
End: 2022-07-22
Payer: MEDICARE

## 2022-07-22 DIAGNOSIS — R41.0 DISORIENTATION, UNSPECIFIED: ICD-10-CM

## 2022-07-22 LAB
APPEARANCE UR: ABNORMAL
BACTERIA #/AREA URNS AUTO: ABNORMAL /HPF
BILIRUB UR QL STRIP.AUTO: NEGATIVE MG/DL
COLOR UR AUTO: YELLOW
GLUCOSE UR QL STRIP.AUTO: NEGATIVE MG/DL
HYALINE CASTS URNS QL MICRO: ABNORMAL /HPF
KETONES UR QL STRIP.AUTO: NEGATIVE MG/DL
LEUKOCYTE ESTERASE UR QL STRIP.AUTO: ABNORMAL UNIT/L
NITRITE UR QL STRIP.AUTO: POSITIVE
PH UR STRIP.AUTO: 6.5 [PH]
PROT UR QL STRIP.AUTO: NEGATIVE MG/DL
RBC #/AREA URNS AUTO: ABNORMAL /HPF
RBC UR QL AUTO: ABNORMAL UNIT/L
SP GR UR STRIP.AUTO: 1.01
SQUAMOUS #/AREA URNS AUTO: ABNORMAL /HPF
UROBILINOGEN UR STRIP-ACNC: 0.2 MG/DL
WBC #/AREA URNS AUTO: ABNORMAL /HPF

## 2022-07-25 LAB
BACTERIA UR CULT: ABNORMAL
BACTERIA UR CULT: ABNORMAL

## 2022-07-26 ENCOUNTER — LAB REQUISITION (OUTPATIENT)
Dept: LAB | Facility: HOSPITAL | Age: 82
End: 2022-07-26
Payer: MEDICARE

## 2022-07-26 DIAGNOSIS — I10 ESSENTIAL (PRIMARY) HYPERTENSION: ICD-10-CM

## 2022-07-26 LAB
ALBUMIN SERPL-MCNC: 2.7 GM/DL (ref 3.4–4.8)
ALBUMIN/GLOB SERPL: 0.9 RATIO (ref 1.1–2)
ALP SERPL-CCNC: 274 UNIT/L (ref 40–150)
ALT SERPL-CCNC: 7 UNIT/L (ref 0–55)
AST SERPL-CCNC: 16 UNIT/L (ref 5–34)
BASOPHILS # BLD AUTO: 0.09 X10(3)/MCL (ref 0–0.2)
BASOPHILS NFR BLD AUTO: 1 %
BILIRUBIN DIRECT+TOT PNL SERPL-MCNC: 0.2 MG/DL (ref 0–0.5)
BILIRUBIN DIRECT+TOT PNL SERPL-MCNC: 0.5 MG/DL
BUN SERPL-MCNC: 13.1 MG/DL (ref 9.8–20.1)
CALCIUM SERPL-MCNC: 9 MG/DL (ref 8.4–10.2)
CHLORIDE SERPL-SCNC: 97 MMOL/L (ref 98–107)
CHOLEST SERPL-MCNC: 144 MG/DL
CHOLEST/HDLC SERPL: 5 {RATIO} (ref 0–5)
CO2 SERPL-SCNC: 30 MMOL/L (ref 23–31)
CREAT SERPL-MCNC: 0.64 MG/DL (ref 0.55–1.02)
EOSINOPHIL # BLD AUTO: 0.78 X10(3)/MCL (ref 0–0.9)
EOSINOPHIL NFR BLD AUTO: 8.8 %
ERYTHROCYTE [DISTWIDTH] IN BLOOD BY AUTOMATED COUNT: 16.3 % (ref 11.5–17)
GLOBULIN SER-MCNC: 2.9 GM/DL (ref 2.4–3.5)
GLUCOSE SERPL-MCNC: 90 MG/DL (ref 82–115)
HCT VFR BLD AUTO: 35 % (ref 37–47)
HDLC SERPL-MCNC: 28 MG/DL (ref 35–60)
HGB BLD-MCNC: 10.7 GM/DL (ref 12–16)
IMM GRANULOCYTES # BLD AUTO: 0.02 X10(3)/MCL (ref 0–0.04)
IMM GRANULOCYTES NFR BLD AUTO: 0.2 %
LDLC SERPL CALC-MCNC: 97 MG/DL (ref 50–140)
LYMPHOCYTES # BLD AUTO: 1.2 X10(3)/MCL (ref 0.6–4.6)
LYMPHOCYTES NFR BLD AUTO: 13.5 %
MCH RBC QN AUTO: 26.4 PG (ref 27–31)
MCHC RBC AUTO-ENTMCNC: 30.6 MG/DL (ref 33–36)
MCV RBC AUTO: 86.2 FL (ref 80–94)
MONOCYTES # BLD AUTO: 0.75 X10(3)/MCL (ref 0.1–1.3)
MONOCYTES NFR BLD AUTO: 8.5 %
NEUTROPHILS # BLD AUTO: 6 X10(3)/MCL (ref 2.1–9.2)
NEUTROPHILS NFR BLD AUTO: 68 %
NRBC BLD AUTO-RTO: 0 %
PLATELET # BLD AUTO: 281 X10(3)/MCL (ref 130–400)
PMV BLD AUTO: 10.4 FL (ref 7.4–10.4)
POTASSIUM SERPL-SCNC: 4.2 MMOL/L (ref 3.5–5.1)
PROT SERPL-MCNC: 5.6 GM/DL (ref 5.8–7.6)
RBC # BLD AUTO: 4.06 X10(6)/MCL (ref 4.2–5.4)
SODIUM SERPL-SCNC: 139 MMOL/L (ref 136–145)
TRIGL SERPL-MCNC: 95 MG/DL (ref 37–140)
TSH SERPL-ACNC: 3.13 UIU/ML (ref 0.35–4.94)
VLDLC SERPL CALC-MCNC: 19 MG/DL
WBC # SPEC AUTO: 8.9 X10(3)/MCL (ref 4.5–11.5)

## 2022-07-26 PROCEDURE — 80061 LIPID PANEL: CPT | Performed by: FAMILY MEDICINE

## 2022-07-26 PROCEDURE — 84443 ASSAY THYROID STIM HORMONE: CPT | Performed by: FAMILY MEDICINE

## 2022-07-26 PROCEDURE — 82248 BILIRUBIN DIRECT: CPT | Performed by: FAMILY MEDICINE

## 2022-07-26 PROCEDURE — 80053 COMPREHEN METABOLIC PANEL: CPT | Performed by: FAMILY MEDICINE

## 2022-07-26 PROCEDURE — 85025 COMPLETE CBC W/AUTO DIFF WBC: CPT | Performed by: FAMILY MEDICINE

## 2022-08-04 ENCOUNTER — LAB REQUISITION (OUTPATIENT)
Dept: LAB | Facility: HOSPITAL | Age: 82
End: 2022-08-04
Attending: FAMILY MEDICINE
Payer: MEDICARE

## 2022-08-04 DIAGNOSIS — D50.9 IRON DEFICIENCY ANEMIA, UNSPECIFIED: ICD-10-CM

## 2022-08-04 DIAGNOSIS — C55 MALIGNANT NEOPLASM OF UTERUS, PART UNSPECIFIED: ICD-10-CM

## 2022-08-04 LAB
BASOPHILS # BLD AUTO: 0.07 X10(3)/MCL (ref 0–0.2)
BASOPHILS NFR BLD AUTO: 0.7 %
EOSINOPHIL # BLD AUTO: 0.64 X10(3)/MCL (ref 0–0.9)
EOSINOPHIL NFR BLD AUTO: 6.2 %
ERYTHROCYTE [DISTWIDTH] IN BLOOD BY AUTOMATED COUNT: 15.7 % (ref 11.5–17)
HCT VFR BLD AUTO: 30.4 % (ref 37–47)
HGB BLD-MCNC: 9.4 GM/DL (ref 12–16)
IMM GRANULOCYTES # BLD AUTO: 0.04 X10(3)/MCL (ref 0–0.04)
IMM GRANULOCYTES NFR BLD AUTO: 0.4 %
LYMPHOCYTES # BLD AUTO: 1.14 X10(3)/MCL (ref 0.6–4.6)
LYMPHOCYTES NFR BLD AUTO: 11 %
MCH RBC QN AUTO: 26.4 PG (ref 27–31)
MCHC RBC AUTO-ENTMCNC: 30.9 MG/DL (ref 33–36)
MCV RBC AUTO: 85.4 FL (ref 80–94)
MONOCYTES # BLD AUTO: 0.72 X10(3)/MCL (ref 0.1–1.3)
MONOCYTES NFR BLD AUTO: 7 %
NEUTROPHILS # BLD AUTO: 7.7 X10(3)/MCL (ref 2.1–9.2)
NEUTROPHILS NFR BLD AUTO: 74.7 %
NRBC BLD AUTO-RTO: 0 %
PLATELET # BLD AUTO: 250 X10(3)/MCL (ref 130–400)
PMV BLD AUTO: 10.9 FL (ref 7.4–10.4)
RBC # BLD AUTO: 3.56 X10(6)/MCL (ref 4.2–5.4)
WBC # SPEC AUTO: 10.3 X10(3)/MCL (ref 4.5–11.5)

## 2022-08-04 PROCEDURE — 85025 COMPLETE CBC W/AUTO DIFF WBC: CPT | Performed by: FAMILY MEDICINE

## 2022-08-08 ENCOUNTER — LAB REQUISITION (OUTPATIENT)
Dept: LAB | Facility: HOSPITAL | Age: 82
End: 2022-08-08
Payer: MEDICARE

## 2022-08-08 DIAGNOSIS — N93.8 OTHER SPECIFIED ABNORMAL UTERINE AND VAGINAL BLEEDING: ICD-10-CM

## 2022-08-08 LAB
ABS NEUT CALC (OHS): 5.69 X10(3)/MCL (ref 2.1–9.2)
BASOPHILS NFR BLD MANUAL: 0 % (ref 0–2)
BASOPHILS NFR BLD MANUAL: 0 X10(3)/MCL (ref 0–0.2)
EOSINOPHIL NFR BLD MANUAL: 0.63 X10(3)/MCL (ref 0–0.9)
EOSINOPHIL NFR BLD MANUAL: 8 % (ref 0–8)
ERYTHROCYTE [DISTWIDTH] IN BLOOD BY AUTOMATED COUNT: 15.2 % (ref 11.5–17)
HCT VFR BLD AUTO: 31.9 % (ref 37–47)
HGB BLD-MCNC: 9.8 GM/DL (ref 12–16)
IMM GRANULOCYTES # BLD AUTO: 0.02 X10(3)/MCL (ref 0–0.04)
IMM GRANULOCYTES NFR BLD AUTO: 0.3 %
LYMPHOCYTES NFR BLD MANUAL: 1.34 X10(3)/MCL
LYMPHOCYTES NFR BLD MANUAL: 17 % (ref 13–40)
MCH RBC QN AUTO: 26.2 PG (ref 27–31)
MCHC RBC AUTO-ENTMCNC: 30.7 MG/DL (ref 33–36)
MCV RBC AUTO: 85.3 FL (ref 80–94)
MONOCYTES NFR BLD MANUAL: 0.24 X10(3)/MCL (ref 0.1–1.3)
MONOCYTES NFR BLD MANUAL: 3 % (ref 2–11)
NEUTROPHILS NFR BLD MANUAL: 72 % (ref 47–80)
NRBC BLD AUTO-RTO: 0 %
PLATELET # BLD AUTO: 303 X10(3)/MCL (ref 130–400)
PMV BLD AUTO: 10.6 FL (ref 7.4–10.4)
RBC # BLD AUTO: 3.74 X10(6)/MCL (ref 4.2–5.4)
WBC # SPEC AUTO: 7.9 X10(3)/MCL (ref 4.5–11.5)

## 2022-08-08 PROCEDURE — 85027 COMPLETE CBC AUTOMATED: CPT | Performed by: FAMILY MEDICINE
